# Patient Record
Sex: MALE | Race: WHITE | NOT HISPANIC OR LATINO | Employment: UNEMPLOYED | URBAN - METROPOLITAN AREA
[De-identification: names, ages, dates, MRNs, and addresses within clinical notes are randomized per-mention and may not be internally consistent; named-entity substitution may affect disease eponyms.]

---

## 2019-02-02 ENCOUNTER — ANESTHESIA EVENT (OUTPATIENT)
Dept: ENDOSCOPY | Facility: HOSPITAL | Age: 84
DRG: 377 | End: 2019-02-02
Payer: MEDICARE

## 2019-02-02 ENCOUNTER — HOSPITAL ENCOUNTER (INPATIENT)
Facility: HOSPITAL | Age: 84
LOS: 6 days | Discharge: NURSING, REHAB OR LONG-TERM CARE | DRG: 377 | End: 2019-02-08
Attending: EMERGENCY MEDICINE | Admitting: INTERNAL MEDICINE
Payer: MEDICARE

## 2019-02-02 ENCOUNTER — ANESTHESIA (OUTPATIENT)
Dept: ENDOSCOPY | Facility: HOSPITAL | Age: 84
DRG: 377 | End: 2019-02-02
Payer: MEDICARE

## 2019-02-02 DIAGNOSIS — K92.1 GASTROINTESTINAL HEMORRHAGE WITH MELENA: ICD-10-CM

## 2019-02-02 DIAGNOSIS — I49.1 ATRIAL ECTOPY: ICD-10-CM

## 2019-02-02 DIAGNOSIS — K92.2 GI BLEED: Primary | ICD-10-CM

## 2019-02-02 DIAGNOSIS — R09.02 HYPOXIA: ICD-10-CM

## 2019-02-02 DIAGNOSIS — D64.9 ANEMIA, UNSPECIFIED TYPE: ICD-10-CM

## 2019-02-02 DIAGNOSIS — R57.9 SHOCK CIRCULATORY: ICD-10-CM

## 2019-02-02 PROBLEM — J44.9 COPD (CHRONIC OBSTRUCTIVE PULMONARY DISEASE): Status: ACTIVE | Noted: 2019-02-02

## 2019-02-02 PROBLEM — I10 HYPERTENSION: Status: ACTIVE | Noted: 2019-02-02

## 2019-02-02 LAB
ABO + RH BLD: NORMAL
ALBUMIN SERPL BCP-MCNC: 1.5 G/DL
ALP SERPL-CCNC: 57 U/L
ALT SERPL W/O P-5'-P-CCNC: 10 U/L
ANION GAP SERPL CALC-SCNC: 9 MMOL/L
ANISOCYTOSIS BLD QL SMEAR: ABNORMAL
ANISOCYTOSIS BLD QL SMEAR: SLIGHT
AST SERPL-CCNC: 15 U/L
BASO STIPL BLD QL SMEAR: ABNORMAL
BASOPHILS # BLD AUTO: 0.01 K/UL
BASOPHILS # BLD AUTO: 0.02 K/UL
BASOPHILS # BLD AUTO: 0.03 K/UL
BASOPHILS # BLD AUTO: 0.05 K/UL
BASOPHILS NFR BLD: 0.1 %
BASOPHILS NFR BLD: 0.2 %
BILIRUB SERPL-MCNC: 0.7 MG/DL
BLD GP AB SCN CELLS X3 SERPL QL: NORMAL
BLD PROD TYP BPU: NORMAL
BLOOD UNIT EXPIRATION DATE: NORMAL
BLOOD UNIT TYPE CODE: 5100
BLOOD UNIT TYPE CODE: 5100
BLOOD UNIT TYPE CODE: 9500
BLOOD UNIT TYPE: NORMAL
BUN SERPL-MCNC: 45 MG/DL
BURR CELLS BLD QL SMEAR: ABNORMAL
BURR CELLS BLD QL SMEAR: ABNORMAL
CALCIUM SERPL-MCNC: 7.3 MG/DL
CHLORIDE SERPL-SCNC: 115 MMOL/L
CO2 SERPL-SCNC: 21 MMOL/L
CODING SYSTEM: NORMAL
CREAT SERPL-MCNC: 0.7 MG/DL
DIFFERENTIAL METHOD: ABNORMAL
DISPENSE STATUS: NORMAL
DOHLE BOD BLD QL SMEAR: PRESENT
EOSINOPHIL # BLD AUTO: 0 K/UL
EOSINOPHIL # BLD AUTO: 0.1 K/UL
EOSINOPHIL NFR BLD: 0 %
EOSINOPHIL NFR BLD: 0.1 %
EOSINOPHIL NFR BLD: 0.2 %
EOSINOPHIL NFR BLD: 0.5 %
ERYTHROCYTE [DISTWIDTH] IN BLOOD BY AUTOMATED COUNT: 16.9 %
ERYTHROCYTE [DISTWIDTH] IN BLOOD BY AUTOMATED COUNT: 17.1 %
ERYTHROCYTE [DISTWIDTH] IN BLOOD BY AUTOMATED COUNT: 17.2 %
ERYTHROCYTE [DISTWIDTH] IN BLOOD BY AUTOMATED COUNT: 19.8 %
EST. GFR  (AFRICAN AMERICAN): >60 ML/MIN/1.73 M^2
EST. GFR  (NON AFRICAN AMERICAN): >60 ML/MIN/1.73 M^2
GIANT PLATELETS BLD QL SMEAR: PRESENT
GIANT PLATELETS BLD QL SMEAR: PRESENT
GLUCOSE SERPL-MCNC: 128 MG/DL
HCT VFR BLD AUTO: 19.4 %
HCT VFR BLD AUTO: 31.6 %
HCT VFR BLD AUTO: 35.2 %
HCT VFR BLD AUTO: 35.7 %
HGB BLD-MCNC: 11.1 G/DL
HGB BLD-MCNC: 11.3 G/DL
HGB BLD-MCNC: 5.8 G/DL
HGB BLD-MCNC: 9.9 G/DL
HYPOCHROMIA BLD QL SMEAR: ABNORMAL
IMM GRANULOCYTES # BLD AUTO: 0.05 K/UL
IMM GRANULOCYTES # BLD AUTO: 0.11 K/UL
IMM GRANULOCYTES NFR BLD AUTO: 0.3 %
IMM GRANULOCYTES NFR BLD AUTO: 0.4 %
IMM GRANULOCYTES NFR BLD AUTO: 0.4 %
IMM GRANULOCYTES NFR BLD AUTO: 0.5 %
INR PPP: 1.7
LIPASE SERPL-CCNC: 20 U/L
LYMPHOCYTES # BLD AUTO: 0.4 K/UL
LYMPHOCYTES # BLD AUTO: 0.5 K/UL
LYMPHOCYTES # BLD AUTO: 0.6 K/UL
LYMPHOCYTES # BLD AUTO: 1.8 K/UL
LYMPHOCYTES NFR BLD: 15 %
LYMPHOCYTES NFR BLD: 2.1 %
LYMPHOCYTES NFR BLD: 4.2 %
LYMPHOCYTES NFR BLD: 4.3 %
MCH RBC QN AUTO: 28.4 PG
MCH RBC QN AUTO: 28.4 PG
MCH RBC QN AUTO: 28.7 PG
MCH RBC QN AUTO: 29.1 PG
MCHC RBC AUTO-ENTMCNC: 29.9 G/DL
MCHC RBC AUTO-ENTMCNC: 31.3 G/DL
MCHC RBC AUTO-ENTMCNC: 31.5 G/DL
MCHC RBC AUTO-ENTMCNC: 31.7 G/DL
MCV RBC AUTO: 90 FL
MCV RBC AUTO: 91 FL
MCV RBC AUTO: 92 FL
MCV RBC AUTO: 96 FL
MONOCYTES # BLD AUTO: 0.9 K/UL
MONOCYTES # BLD AUTO: 1 K/UL
MONOCYTES # BLD AUTO: 1.3 K/UL
MONOCYTES # BLD AUTO: 1.3 K/UL
MONOCYTES NFR BLD: 10.9 %
MONOCYTES NFR BLD: 6.1 %
MONOCYTES NFR BLD: 6.7 %
MONOCYTES NFR BLD: 7.7 %
NEUTROPHILS # BLD AUTO: 13.1 K/UL
NEUTROPHILS # BLD AUTO: 18.8 K/UL
NEUTROPHILS # BLD AUTO: 8.7 K/UL
NEUTROPHILS # BLD AUTO: 9.9 K/UL
NEUTROPHILS NFR BLD: 73.1 %
NEUTROPHILS NFR BLD: 87.2 %
NEUTROPHILS NFR BLD: 88.5 %
NEUTROPHILS NFR BLD: 91.1 %
NRBC BLD-RTO: 0 /100 WBC
OVALOCYTES BLD QL SMEAR: ABNORMAL
OVALOCYTES BLD QL SMEAR: ABNORMAL
PLATELET # BLD AUTO: 62 K/UL
PLATELET # BLD AUTO: 62 K/UL
PLATELET # BLD AUTO: 73 K/UL
PLATELET # BLD AUTO: 87 K/UL
PLATELET BLD QL SMEAR: ABNORMAL
PMV BLD AUTO: 10.5 FL
PMV BLD AUTO: 11 FL
PMV BLD AUTO: 11.6 FL
PMV BLD AUTO: 11.7 FL
POIKILOCYTOSIS BLD QL SMEAR: SLIGHT
POIKILOCYTOSIS BLD QL SMEAR: SLIGHT
POLYCHROMASIA BLD QL SMEAR: ABNORMAL
POLYCHROMASIA BLD QL SMEAR: ABNORMAL
POTASSIUM SERPL-SCNC: 4 MMOL/L
PROT SERPL-MCNC: 3.5 G/DL
PROTHROMBIN TIME: 17 SEC
RBC # BLD AUTO: 2.02 M/UL
RBC # BLD AUTO: 3.48 M/UL
RBC # BLD AUTO: 3.81 M/UL
RBC # BLD AUTO: 3.98 M/UL
SCHISTOCYTES BLD QL SMEAR: ABNORMAL
SODIUM SERPL-SCNC: 145 MMOL/L
TOXIC GRANULES BLD QL SMEAR: PRESENT
TRANS ERYTHROCYTES VOL PATIENT: NORMAL ML
WBC # BLD AUTO: 11.38 K/UL
WBC # BLD AUTO: 11.92 K/UL
WBC # BLD AUTO: 14.84 K/UL
WBC # BLD AUTO: 20.65 K/UL

## 2019-02-02 PROCEDURE — 43235 EGD DIAGNOSTIC BRUSH WASH: CPT | Performed by: INTERNAL MEDICINE

## 2019-02-02 PROCEDURE — P9021 RED BLOOD CELLS UNIT: HCPCS

## 2019-02-02 PROCEDURE — 86901 BLOOD TYPING SEROLOGIC RH(D): CPT

## 2019-02-02 PROCEDURE — 96365 THER/PROPH/DIAG IV INF INIT: CPT

## 2019-02-02 PROCEDURE — 63600175 PHARM REV CODE 636 W HCPCS: Performed by: NURSE PRACTITIONER

## 2019-02-02 PROCEDURE — 20000000 HC ICU ROOM

## 2019-02-02 PROCEDURE — 71000033 HC RECOVERY, INTIAL HOUR: Performed by: INTERNAL MEDICINE

## 2019-02-02 PROCEDURE — 25000003 PHARM REV CODE 250

## 2019-02-02 PROCEDURE — 36000707: Performed by: INTERNAL MEDICINE

## 2019-02-02 PROCEDURE — 99223 PR INITIAL HOSPITAL CARE,LEVL III: ICD-10-PCS | Mod: ,,, | Performed by: INTERNAL MEDICINE

## 2019-02-02 PROCEDURE — 43235 EGD DIAGNOSTIC BRUSH WASH: CPT | Mod: ,,, | Performed by: INTERNAL MEDICINE

## 2019-02-02 PROCEDURE — 25000003 PHARM REV CODE 250: Performed by: EMERGENCY MEDICINE

## 2019-02-02 PROCEDURE — 93010 EKG 12-LEAD: ICD-10-PCS | Mod: ,,, | Performed by: INTERNAL MEDICINE

## 2019-02-02 PROCEDURE — 83690 ASSAY OF LIPASE: CPT

## 2019-02-02 PROCEDURE — 99232 SBSQ HOSP IP/OBS MODERATE 35: CPT | Mod: 25,,, | Performed by: INTERNAL MEDICINE

## 2019-02-02 PROCEDURE — 37000009 HC ANESTHESIA EA ADD 15 MINS: Performed by: INTERNAL MEDICINE

## 2019-02-02 PROCEDURE — 99291 CRITICAL CARE FIRST HOUR: CPT | Mod: ,,, | Performed by: EMERGENCY MEDICINE

## 2019-02-02 PROCEDURE — D9220A PRA ANESTHESIA: ICD-10-PCS | Mod: CRNA,,, | Performed by: NURSE ANESTHETIST, CERTIFIED REGISTERED

## 2019-02-02 PROCEDURE — D9220A PRA ANESTHESIA: ICD-10-PCS | Mod: ANES,,, | Performed by: ANESTHESIOLOGY

## 2019-02-02 PROCEDURE — 36000706: Performed by: INTERNAL MEDICINE

## 2019-02-02 PROCEDURE — 99291 PR CRITICAL CARE, E/M 30-74 MINUTES: ICD-10-PCS | Mod: ,,, | Performed by: EMERGENCY MEDICINE

## 2019-02-02 PROCEDURE — C9113 INJ PANTOPRAZOLE SODIUM, VIA: HCPCS | Performed by: NURSE PRACTITIONER

## 2019-02-02 PROCEDURE — 93005 ELECTROCARDIOGRAM TRACING: CPT

## 2019-02-02 PROCEDURE — 51702 INSERT TEMP BLADDER CATH: CPT | Mod: 59

## 2019-02-02 PROCEDURE — D9220A PRA ANESTHESIA: Mod: ANES,,, | Performed by: ANESTHESIOLOGY

## 2019-02-02 PROCEDURE — 99232 PR SUBSEQUENT HOSPITAL CARE,LEVL II: ICD-10-PCS | Mod: 25,,, | Performed by: INTERNAL MEDICINE

## 2019-02-02 PROCEDURE — 25000003 PHARM REV CODE 250: Performed by: NURSE ANESTHETIST, CERTIFIED REGISTERED

## 2019-02-02 PROCEDURE — 37000008 HC ANESTHESIA 1ST 15 MINUTES: Performed by: INTERNAL MEDICINE

## 2019-02-02 PROCEDURE — 99223 1ST HOSP IP/OBS HIGH 75: CPT | Mod: ,,, | Performed by: INTERNAL MEDICINE

## 2019-02-02 PROCEDURE — 85610 PROTHROMBIN TIME: CPT

## 2019-02-02 PROCEDURE — 63600175 PHARM REV CODE 636 W HCPCS: Performed by: EMERGENCY MEDICINE

## 2019-02-02 PROCEDURE — 94761 N-INVAS EAR/PLS OXIMETRY MLT: CPT

## 2019-02-02 PROCEDURE — 36430 TRANSFUSION BLD/BLD COMPNT: CPT | Mod: 59

## 2019-02-02 PROCEDURE — 99285 EMERGENCY DEPT VISIT HI MDM: CPT | Mod: 25

## 2019-02-02 PROCEDURE — D9220A PRA ANESTHESIA: Mod: CRNA,,, | Performed by: NURSE ANESTHETIST, CERTIFIED REGISTERED

## 2019-02-02 PROCEDURE — 80053 COMPREHEN METABOLIC PANEL: CPT

## 2019-02-02 PROCEDURE — 86920 COMPATIBILITY TEST SPIN: CPT

## 2019-02-02 PROCEDURE — C9113 INJ PANTOPRAZOLE SODIUM, VIA: HCPCS | Performed by: EMERGENCY MEDICINE

## 2019-02-02 PROCEDURE — 85025 COMPLETE CBC W/AUTO DIFF WBC: CPT

## 2019-02-02 PROCEDURE — 96375 TX/PRO/DX INJ NEW DRUG ADDON: CPT

## 2019-02-02 PROCEDURE — 93010 ELECTROCARDIOGRAM REPORT: CPT | Mod: ,,, | Performed by: INTERNAL MEDICINE

## 2019-02-02 PROCEDURE — 71000039 HC RECOVERY, EACH ADD'L HOUR: Performed by: INTERNAL MEDICINE

## 2019-02-02 PROCEDURE — 63600175 PHARM REV CODE 636 W HCPCS: Performed by: NURSE ANESTHETIST, CERTIFIED REGISTERED

## 2019-02-02 PROCEDURE — 43235 PR EGD, FLEX, DIAGNOSTIC: ICD-10-PCS | Mod: ,,, | Performed by: INTERNAL MEDICINE

## 2019-02-02 PROCEDURE — 36430 TRANSFUSION BLD/BLD COMPNT: CPT

## 2019-02-02 RX ORDER — NOREPINEPHRINE BITARTRATE/D5W 4MG/250ML
0.02 PLASTIC BAG, INJECTION (ML) INTRAVENOUS CONTINUOUS
Status: DISCONTINUED | OUTPATIENT
Start: 2019-02-02 | End: 2019-02-04

## 2019-02-02 RX ORDER — PROPOFOL 10 MG/ML
VIAL (ML) INTRAVENOUS
Status: DISCONTINUED | OUTPATIENT
Start: 2019-02-02 | End: 2019-02-02

## 2019-02-02 RX ORDER — POTASSIUM CHLORIDE 20 MEQ/1
40 TABLET, EXTENDED RELEASE ORAL ONCE
COMMUNITY

## 2019-02-02 RX ORDER — EPHEDRINE SULFATE 50 MG/ML
INJECTION, SOLUTION INTRAVENOUS
Status: DISCONTINUED | OUTPATIENT
Start: 2019-02-02 | End: 2019-02-02

## 2019-02-02 RX ORDER — ERYTHROMYCIN 5 MG/G
1 OINTMENT OPHTHALMIC NIGHTLY
COMMUNITY

## 2019-02-02 RX ORDER — LORATADINE 10 MG/1
10 TABLET ORAL DAILY
COMMUNITY

## 2019-02-02 RX ORDER — SODIUM CHLORIDE 0.9 % (FLUSH) 0.9 %
3 SYRINGE (ML) INJECTION
Status: DISCONTINUED | OUTPATIENT
Start: 2019-02-02 | End: 2019-02-08 | Stop reason: HOSPADM

## 2019-02-02 RX ORDER — HYDROCODONE BITARTRATE AND ACETAMINOPHEN 500; 5 MG/1; MG/1
TABLET ORAL
Status: DISCONTINUED | OUTPATIENT
Start: 2019-02-02 | End: 2019-02-08 | Stop reason: HOSPADM

## 2019-02-02 RX ORDER — SIMVASTATIN 5 MG/1
20 TABLET, FILM COATED ORAL NIGHTLY
Status: DISCONTINUED | OUTPATIENT
Start: 2019-02-02 | End: 2019-02-06

## 2019-02-02 RX ORDER — MIDAZOLAM HYDROCHLORIDE 1 MG/ML
INJECTION, SOLUTION INTRAMUSCULAR; INTRAVENOUS
Status: DISCONTINUED | OUTPATIENT
Start: 2019-02-02 | End: 2019-02-02

## 2019-02-02 RX ORDER — GUAIFENESIN 100 MG/5ML
10 SOLUTION ORAL 4 TIMES DAILY PRN
COMMUNITY

## 2019-02-02 RX ORDER — DOCUSATE SODIUM 100 MG/1
100 CAPSULE, LIQUID FILLED ORAL 2 TIMES DAILY
COMMUNITY

## 2019-02-02 RX ORDER — FERROUS GLUCONATE 324(38)MG
324 TABLET ORAL
COMMUNITY

## 2019-02-02 RX ORDER — ASPIRIN 81 MG/1
81 TABLET ORAL DAILY
Status: ON HOLD | COMMUNITY
End: 2019-02-08 | Stop reason: HOSPADM

## 2019-02-02 RX ORDER — PHENYLEPHRINE HYDROCHLORIDE 10 MG/ML
INJECTION INTRAVENOUS
Status: DISCONTINUED | OUTPATIENT
Start: 2019-02-02 | End: 2019-02-02

## 2019-02-02 RX ORDER — FENTANYL CITRATE 50 UG/ML
25 INJECTION, SOLUTION INTRAMUSCULAR; INTRAVENOUS EVERY 5 MIN PRN
Status: DISCONTINUED | OUTPATIENT
Start: 2019-02-02 | End: 2019-02-02 | Stop reason: HOSPADM

## 2019-02-02 RX ORDER — NOREPINEPHRINE BITARTRATE 1 MG/ML
INJECTION, SOLUTION INTRAVENOUS
Status: COMPLETED
Start: 2019-02-02 | End: 2019-02-02

## 2019-02-02 RX ORDER — TAMSULOSIN HYDROCHLORIDE 0.4 MG/1
0.4 CAPSULE ORAL DAILY
COMMUNITY

## 2019-02-02 RX ORDER — LANOLIN ALCOHOL/MO/W.PET/CERES
1000 CREAM (GRAM) TOPICAL DAILY
COMMUNITY

## 2019-02-02 RX ORDER — FINASTERIDE 5 MG/1
5 TABLET, FILM COATED ORAL DAILY
COMMUNITY

## 2019-02-02 RX ORDER — OXYCODONE HYDROCHLORIDE 5 MG/1
5 TABLET ORAL
Status: DISCONTINUED | OUTPATIENT
Start: 2019-02-02 | End: 2019-02-02 | Stop reason: HOSPADM

## 2019-02-02 RX ORDER — SIMVASTATIN 20 MG/1
20 TABLET, FILM COATED ORAL NIGHTLY
COMMUNITY

## 2019-02-02 RX ORDER — KETAMINE HYDROCHLORIDE 10 MG/ML
INJECTION, SOLUTION INTRAMUSCULAR; INTRAVENOUS
Status: DISCONTINUED | OUTPATIENT
Start: 2019-02-02 | End: 2019-02-02

## 2019-02-02 RX ORDER — PANTOPRAZOLE SODIUM 40 MG/10ML
80 INJECTION, POWDER, LYOPHILIZED, FOR SOLUTION INTRAVENOUS
Status: COMPLETED | OUTPATIENT
Start: 2019-02-02 | End: 2019-02-02

## 2019-02-02 RX ORDER — FUROSEMIDE 10 MG/ML
20 INJECTION INTRAMUSCULAR; INTRAVENOUS
Status: COMPLETED | OUTPATIENT
Start: 2019-02-02 | End: 2019-02-02

## 2019-02-02 RX ORDER — FINASTERIDE 5 MG/1
5 TABLET, FILM COATED ORAL DAILY
Status: DISCONTINUED | OUTPATIENT
Start: 2019-02-02 | End: 2019-02-06

## 2019-02-02 RX ORDER — FLUTICASONE FUROATE AND VILANTEROL 100; 25 UG/1; UG/1
1 POWDER RESPIRATORY (INHALATION) DAILY
COMMUNITY

## 2019-02-02 RX ORDER — FUROSEMIDE 20 MG/1
10 TABLET ORAL DAILY
COMMUNITY

## 2019-02-02 RX ORDER — ACETAMINOPHEN 325 MG/1
650 TABLET ORAL EVERY 4 HOURS PRN
COMMUNITY

## 2019-02-02 RX ORDER — PANTOPRAZOLE SODIUM 40 MG/10ML
40 INJECTION, POWDER, LYOPHILIZED, FOR SOLUTION INTRAVENOUS 2 TIMES DAILY
Status: DISCONTINUED | OUTPATIENT
Start: 2019-02-02 | End: 2019-02-03

## 2019-02-02 RX ORDER — ALBUTEROL SULFATE 1.25 MG/3ML
1.25 SOLUTION RESPIRATORY (INHALATION) EVERY 6 HOURS PRN
COMMUNITY

## 2019-02-02 RX ADMIN — PANTOPRAZOLE SODIUM 40 MG: 40 INJECTION, POWDER, FOR SOLUTION INTRAVENOUS at 10:02

## 2019-02-02 RX ADMIN — EPHEDRINE SULFATE 20 MG: 50 INJECTION, SOLUTION INTRAMUSCULAR; INTRAVENOUS; SUBCUTANEOUS at 04:02

## 2019-02-02 RX ADMIN — PANTOPRAZOLE SODIUM 40 MG: 40 INJECTION, POWDER, FOR SOLUTION INTRAVENOUS at 08:02

## 2019-02-02 RX ADMIN — MIDAZOLAM HYDROCHLORIDE 1 MG: 1 INJECTION, SOLUTION INTRAMUSCULAR; INTRAVENOUS at 04:02

## 2019-02-02 RX ADMIN — PANTOPRAZOLE SODIUM 80 MG: 40 INJECTION, POWDER, FOR SOLUTION INTRAVENOUS at 02:02

## 2019-02-02 RX ADMIN — PROPOFOL 50 MG: 10 INJECTION, EMULSION INTRAVENOUS at 04:02

## 2019-02-02 RX ADMIN — FUROSEMIDE 20 MG: 10 INJECTION, SOLUTION INTRAVENOUS at 03:02

## 2019-02-02 RX ADMIN — SODIUM CHLORIDE 1000 ML: 0.9 INJECTION, SOLUTION INTRAVENOUS at 02:02

## 2019-02-02 RX ADMIN — Medication 0.04 MCG/KG/MIN: at 11:02

## 2019-02-02 RX ADMIN — PHENYLEPHRINE HYDROCHLORIDE 200 MCG: 10 INJECTION INTRAVENOUS at 04:02

## 2019-02-02 RX ADMIN — VASOPRESSIN 5 UNITS: 20 INJECTION INTRAVENOUS at 04:02

## 2019-02-02 RX ADMIN — EPHEDRINE SULFATE 10 MG: 50 INJECTION, SOLUTION INTRAMUSCULAR; INTRAVENOUS; SUBCUTANEOUS at 04:02

## 2019-02-02 RX ADMIN — PHENYLEPHRINE HYDROCHLORIDE 400 MCG: 10 INJECTION INTRAVENOUS at 04:02

## 2019-02-02 RX ADMIN — CALCIUM GLUCONATE 1 G: 98 INJECTION, SOLUTION INTRAVENOUS at 03:02

## 2019-02-02 RX ADMIN — Medication 0.02 MCG/KG/MIN: at 10:02

## 2019-02-02 RX ADMIN — FINASTERIDE 5 MG: 5 TABLET, FILM COATED ORAL at 02:02

## 2019-02-02 RX ADMIN — VASOPRESSIN 3 UNITS: 20 INJECTION INTRAVENOUS at 04:02

## 2019-02-02 RX ADMIN — SODIUM CHLORIDE, SODIUM GLUCONATE, SODIUM ACETATE, POTASSIUM CHLORIDE, MAGNESIUM CHLORIDE, SODIUM PHOSPHATE, DIBASIC, AND POTASSIUM PHOSPHATE: .53; .5; .37; .037; .03; .012; .00082 INJECTION, SOLUTION INTRAVENOUS at 04:02

## 2019-02-02 RX ADMIN — KETAMINE HYDROCHLORIDE 20 MG: 10 INJECTION, SOLUTION INTRAMUSCULAR; INTRAVENOUS at 04:02

## 2019-02-02 NOTE — HOSPITAL COURSE
Patient admitted to MICU for close monitoring. EGD performed the day of presentation showed Grade D reflux esophagitis, duodenitis, and a large duodenal ulcer which was not amenable to intervention.     Overnight, patient had two melanotic bowel movements and hemoglobin dropped from 11.4 to 9.3. Per GI recs, IR was consulted and CTA performed; however, no active bleeding seen on the scan. Therefore, no embolization possible. Patient started on PPI drip and kept NPO per GI recs.     This morning, patient had an episodes of bleeding overnight which was not significant per nursing and CBC remains stable, BP remained stable with no need for pressors.  Will wean off oxygen as tolerated , per nursing he was sitting in bed this morning or RA with no desaturations , on rounds he was on 2 L doing well,stanislav step down today

## 2019-02-02 NOTE — ED NOTES
Psychosocial:  Patient is calm and cooperative.  Patients insight and judgement are appropriate to situation.  Appears clean, well maintained, with clothing appropriate to environment.  No evidence of hallucinations, delusions, or psychosis.    Neuro:  Eyes open spontaneously.  Awake, alert.  Oriented x 4.  Speech clear and appropriate.  Tolerating saliva secretions well.  Able to follow commands, demonstrating ability to actively and appropriately communicate within context of current conversation.      Airway:  Bilateral chest rise and fall.  RR regular and non labored.  Air entry patent.    Circulatory:  Skin warm, dry, and pink.  Apical and radial pulses strong and regular.     Abdomen:  Abdomen soft and non distended.      Urinary:  Indwelling guzman catheter in place.

## 2019-02-02 NOTE — ED TRIAGE NOTES
No consent done for PRBC transfusion.  The transfusion was emergent given his severe anemia and hypotension.  Emergency release blood used.

## 2019-02-02 NOTE — SUBJECTIVE & OBJECTIVE
Past Medical History:   Diagnosis Date    Anemia     Arthritis     BPH (benign prostatic hyperplasia)     Cataract     COPD (chronic obstructive pulmonary disease)     Dysphagia     Emphysema lung     GERD (gastroesophageal reflux disease)     Heart failure     Hyperlipidemia     Hypertension     Hypokalemia     MI (myocardial infarction)     Osteoarthritis     Stroke        No past surgical history on file.    Review of patient's allergies indicates:  No Known Allergies  Family History     None        Tobacco Use    Smoking status: Not on file   Substance and Sexual Activity    Alcohol use: Not on file    Drug use: Not on file    Sexual activity: Not on file     Review of Systems   Constitutional: Positive for activity change and fatigue. Negative for appetite change, chills and fever.   HENT: Negative for trouble swallowing.    Eyes: Negative for visual disturbance.   Respiratory: Negative for cough and shortness of breath.    Cardiovascular: Negative for chest pain.   Gastrointestinal: Negative for abdominal distention, abdominal pain, anal bleeding, blood in stool, constipation, diarrhea, nausea and vomiting.   Genitourinary: Negative for flank pain.   Musculoskeletal: Negative for arthralgias and back pain.   Skin: Negative for color change.   Allergic/Immunologic: Negative for immunocompromised state.   Neurological: Positive for dizziness.   Psychiatric/Behavioral: Negative for confusion.     Objective:     Vital Signs (Most Recent):  Temp: 97.9 °F (36.6 °C) (02/02/19 1322)  Pulse: 68 (02/02/19 1322)  Resp: (!) 21 (02/02/19 1322)  BP: (!) 105/57 (02/02/19 1322)  SpO2: (!) 92 % (02/02/19 1322) Vital Signs (24h Range):  Temp:  [95.4 °F (35.2 °C)-98.6 °F (37 °C)] 97.9 °F (36.6 °C)  Pulse:  [62-77] 68  Resp:  [15-85] 21  SpO2:  [85 %-100 %] 92 %  BP: ()/(42-66) 105/57     Weight: 43.1 kg (95 lb) (02/02/19 0147)  Body mass index is 14.88 kg/m².      Intake/Output Summary (Last 24 hours) at  2/2/2019 1325  Last data filed at 2/2/2019 0445  Gross per 24 hour   Intake 1815.85 ml   Output 400 ml   Net 1415.85 ml       Lines/Drains/Airways     Drain                 Urethral Catheter 02/02/19 0315 Temperature probe less than 1 day          Peripheral Intravenous Line                 Peripheral IV - Single Lumen 02/02/19 0149 Left Forearm less than 1 day         Peripheral IV - Single Lumen 02/02/19 0150 Right Forearm less than 1 day                Physical Exam   Constitutional: He is oriented to person, place, and time. He appears cachectic. No distress.   HENT:   Head: Normocephalic.   Eyes: Conjunctivae are normal. No scleral icterus.   Neck: Normal range of motion. Neck supple.   Cardiovascular: Normal rate and regular rhythm.   Pulmonary/Chest: Effort normal and breath sounds normal.   Abdominal: Soft. Bowel sounds are normal. He exhibits no distension and no mass. There is no tenderness. There is no guarding.   Musculoskeletal: Normal range of motion.   Neurological: He is oriented to person, place, and time.   Skin: Skin is warm and dry. There is pallor.   Psychiatric: He has a normal mood and affect.       Significant Labs:  CBC:   Recent Labs   Lab 02/02/19  0157 02/02/19  0457 02/02/19  0831   WBC 11.92 20.65* 14.84*   HGB 5.8* 11.1* 11.3*   HCT 19.4* 35.2* 35.7*   PLT 87* 62* 62*     BMP:   Recent Labs   Lab 02/02/19 0157   *      K 4.0   *   CO2 21*   BUN 45*   CREATININE 0.7   CALCIUM 7.3*     CMP:   Recent Labs   Lab 02/02/19 0157   *   CALCIUM 7.3*   ALBUMIN 1.5*   PROT 3.5*      K 4.0   CO2 21*   *   BUN 45*   CREATININE 0.7   ALKPHOS 57   ALT 10   AST 15   BILITOT 0.7     Coagulation:   Recent Labs   Lab 02/02/19 0157   INR 1.7*     Lipase:   Recent Labs   Lab 02/02/19 0157   LIPASE 20       Significant Imaging:  Imaging results within the past 24 hours have been reviewed.

## 2019-02-02 NOTE — ED NOTES
Critical care aware that there is no blood consent documentation at bedside; they reported that they will obtain one if more additional blood is required.

## 2019-02-02 NOTE — HPI
"Mr. Krish Blackburn is a 87 y.o. with a PMH of HLD, COPD ( on home oxygen) who presented from NH for GI bleed. When asking the patient he kept saying " I do not know why I am here." In the ED patient found to have a hgb of 5.8, INR of 1.7, and BUN of 45. He was hypotensive with SBPs in the 70 . He denied any SOB, lightheadedness, palpitation or chest pain. When examined he had melena on the padding. He was oriented to self and place.     In the ED he was given 3 units of blood and MICU was consulted for admission. GI was consulted in the ED     Can not determine if/when patient had a colonoscopy or endoscopy   "

## 2019-02-02 NOTE — TRANSFER OF CARE
"Anesthesia Transfer of Care Note    Patient: Krish Blackburn    Procedure(s) Performed: Procedure(s) (LRB):  EGD (ESOPHAGOGASTRODUODENOSCOPY) (N/A)    Patient location: PACU    Anesthesia Type: general    Transport from OR: Transported from OR on 6-10 L/min O2 by face mask with adequate spontaneous ventilation    Post pain: adequate analgesia    Post assessment: no apparent anesthetic complications and tolerated procedure well    Post vital signs: stable    Level of consciousness: lethargic    Nausea/Vomiting: no nausea/vomiting    Complications: none    Transfer of care protocol was followed      Last vitals:   Visit Vitals  /65   Pulse (P) 95   Temp (P) 36.6 °C (97.9 °F) (Temporal)   Resp (P) 17   Ht 5' 7" (1.702 m)   Wt 43.1 kg (95 lb)   SpO2 (P) 100%   BMI 14.88 kg/m²     "

## 2019-02-02 NOTE — ED NOTES
Per Dr Arambula. No consent done for PRBC transfusion.  The transfusion was emergent given his severe anemia and hypotension.  Emergency release blood used.

## 2019-02-02 NOTE — ED NOTES
Jakub with critical care made aware of patient's bloody bowel movement and hypotension.  Timme reported that patient's Map should remain at 65 and above when asked what blood pressure parameters should be met.

## 2019-02-02 NOTE — CONSULTS
Patient evaluated by and admitted to Critical Care Medicine. Full H&P to follow.    Dania Leung NP  Critical Care Medicine

## 2019-02-02 NOTE — ASSESSMENT & PLAN NOTE
Patient is an 86 y/o male with unclear history of PUD ~1.5 years ago, presented with melena, hypotension and symptomatic anemia. All signs are suggestive of UGIB. No PPI on outpatient medication list. No history of liver disease. We will plan for EGD today to evaluate for source of bleeding.    Protonix 40mg IV BID  Intravascular resuscitation/support with IVFs and pRBCs as needed.  Serial H/H's transfuse as needed.  Discontinue all NSAIDs and Heparin products  Please correct any coagulopathy with platelets and FFP to a goal of platelets >50K and INR <2.5  Maintain IV access with 2 large bore IVs  Keep NPO.  Plan for EGD today

## 2019-02-02 NOTE — CONSULTS
[facility]  Gastroenterology  Consult Note    Patient Name: Krish Blackburn  MRN: [id number]  Admission Date: 2/2/2019  Hospital Length of Stay: 0 days  Code Status: Full Code   Attending Provider: Sascha Duran MD   Consulting Provider: Tal Lisa MD  Primary Care Physician: No primary care provider on file.  Principal Problem:GI bleed    Inpatient consult to Gastroenterology  Consult performed by: Tal Lisa MD  Consult ordered by: Tejas Arambula MD        Subjective:     HPI:  Krish Blackburn is an 88 y/o male with past medical history of HTN, HLD, COPD and reported PUD by family, presented with melena and anemia from nursing home, GI consulted for evaluation.    The patient has moderate dementia with limited history. He is not sure why he is in the hospital. He denies having abdominal pain, nausea or vomiting. He is not sure if he had melena, but per NH and ED report, patient had multiple episodes of melena overnight. Patient endorses mild dizziness, and states he had always been short of breath due to COPD. Daughter reports that the patient had an ulcer in his stomach that they were told was small and was not treated with endoscopy.    The patient was noted to be hypotensive in the ED with SBP in the 70's, but improved with resuscitation. His Hb was 5.4, but unknown baseline. Hb improved to 11 after 2 units of pRBCs. Patient is admitted to the ICU.    Past Medical History:   Diagnosis Date    Anemia     Arthritis     BPH (benign prostatic hyperplasia)     Cataract     COPD (chronic obstructive pulmonary disease)     Dysphagia     Emphysema lung     GERD (gastroesophageal reflux disease)     Heart failure     Hyperlipidemia     Hypertension     Hypokalemia     MI (myocardial infarction)     Osteoarthritis     Stroke        No past surgical history on file.    Review of patient's allergies indicates:  No Known Allergies  Family History     None        Tobacco Use    Smoking  status: Not on file   Substance and Sexual Activity    Alcohol use: Not on file    Drug use: Not on file    Sexual activity: Not on file     Review of Systems   Constitutional: Positive for activity change and fatigue. Negative for appetite change, chills and fever.   HENT: Negative for trouble swallowing.    Eyes: Negative for visual disturbance.   Respiratory: Negative for cough and shortness of breath.    Cardiovascular: Negative for chest pain.   Gastrointestinal: Negative for abdominal distention, abdominal pain, anal bleeding, blood in stool, constipation, diarrhea, nausea and vomiting.   Genitourinary: Negative for flank pain.   Musculoskeletal: Negative for arthralgias and back pain.   Skin: Negative for color change.   Allergic/Immunologic: Negative for immunocompromised state.   Neurological: Positive for dizziness.   Psychiatric/Behavioral: Negative for confusion.     Objective:     Vital Signs (Most Recent):  Temp: 97.9 °F (36.6 °C) (02/02/19 1322)  Pulse: 68 (02/02/19 1322)  Resp: (!) 21 (02/02/19 1322)  BP: (!) 105/57 (02/02/19 1322)  SpO2: (!) 92 % (02/02/19 1322) Vital Signs (24h Range):  Temp:  [95.4 °F (35.2 °C)-98.6 °F (37 °C)] 97.9 °F (36.6 °C)  Pulse:  [62-77] 68  Resp:  [15-85] 21  SpO2:  [85 %-100 %] 92 %  BP: ()/(42-66) 105/57     Weight: 43.1 kg (95 lb) (02/02/19 0147)  Body mass index is 14.88 kg/m².      Intake/Output Summary (Last 24 hours) at 2/2/2019 1325  Last data filed at 2/2/2019 0445  Gross per 24 hour   Intake 1815.85 ml   Output 400 ml   Net 1415.85 ml       Lines/Drains/Airways     Drain                 Urethral Catheter 02/02/19 0315 Temperature probe less than 1 day          Peripheral Intravenous Line                 Peripheral IV - Single Lumen 02/02/19 0149 Left Forearm less than 1 day         Peripheral IV - Single Lumen 02/02/19 0150 Right Forearm less than 1 day                Physical Exam   Constitutional: He is oriented to person, place, and time. He appears  cachectic. No distress.   HENT:   Head: Normocephalic.   Eyes: Conjunctivae are normal. No scleral icterus.   Neck: Normal range of motion. Neck supple.   Cardiovascular: Normal rate and regular rhythm.   Pulmonary/Chest: Effort normal and breath sounds normal.   Abdominal: Soft. Bowel sounds are normal. He exhibits no distension and no mass. There is no tenderness. There is no guarding.   Musculoskeletal: Normal range of motion.   Neurological: He is oriented to person, place, and time.   Skin: Skin is warm and dry. There is pallor.   Psychiatric: He has a normal mood and affect.       Significant Labs:  CBC:   Recent Labs   Lab 02/02/19 0157 02/02/19  0457 02/02/19  0831   WBC 11.92 20.65* 14.84*   HGB 5.8* 11.1* 11.3*   HCT 19.4* 35.2* 35.7*   PLT 87* 62* 62*     BMP:   Recent Labs   Lab 02/02/19 0157   *      K 4.0   *   CO2 21*   BUN 45*   CREATININE 0.7   CALCIUM 7.3*     CMP:   Recent Labs   Lab 02/02/19 0157   *   CALCIUM 7.3*   ALBUMIN 1.5*   PROT 3.5*      K 4.0   CO2 21*   *   BUN 45*   CREATININE 0.7   ALKPHOS 57   ALT 10   AST 15   BILITOT 0.7     Coagulation:   Recent Labs   Lab 02/02/19 0157   INR 1.7*     Lipase:   Recent Labs   Lab 02/02/19 0157   LIPASE 20       Significant Imaging:  Imaging results within the past 24 hours have been reviewed.    Assessment/Plan:     * GI bleed    Patient is an 86 y/o male with unclear history of PUD ~1.5 years ago, presented with melena, hypotension and symptomatic anemia. All signs are suggestive of UGIB. No PPI on outpatient medication list. No history of liver disease. We will plan for EGD today to evaluate for source of bleeding.    Protonix 40mg IV BID  Intravascular resuscitation/support with IVFs and pRBCs as needed.  Serial H/H's transfuse as needed.  Discontinue all NSAIDs and Heparin products  Please correct any coagulopathy with platelets and FFP to a goal of platelets >50K and INR <2.5  Maintain IV access with  2 large bore IVs  Keep NPO.  Plan for EGD today           Thank you for your consult. I will follow-up with patient. Please contact us if you have any additional questions.    Bernardo Lisa MD  Gastroenterology  [facility]

## 2019-02-02 NOTE — HPI
Krish Blackburn is an 86 y/o male with past medical history of HTN, HLD, COPD and reported PUD by family, presented with melena and anemia from nursing home, GI consulted for evaluation.    The patient has moderate dementia with limited history. He is not sure why he is in the hospital. He denies having abdominal pain, nausea or vomiting. He is not sure if he had melena, but per NH and ED report, patient had multiple episodes of melena overnight. Patient endorses mild dizziness, and states he had always been short of breath due to COPD. Daughter reports that the patient had an ulcer in his stomach that they were told was small and was not treated with endoscopy.    The patient was noted to be hypotensive in the ED with SBP in the 70's, but improved with resuscitation. His Hb was 5.4, but unknown baseline. Hb improved to 11 after 2 units of pRBCs. Patient is admitted to the ICU.

## 2019-02-02 NOTE — H&P
"[facility]  Critical Care Medicine  History & Physical    Patient Name: Krish Blackburn  MRN: [id number]  Admission Date: 2/2/2019  Hospital Length of Stay: 0 days  Code Status: Full Code  Attending Physician: Sascha Duran MD   Primary Care Provider: No primary care provider on file.   Principal Problem: GI bleed    Subjective:     HPI:  Mr. Krish Blackburn is a 87 y.o. with a PMH of HLD, HTN, COPD ( on home oxygen) who presented from NH for GI bleed. When asking the patient he kept saying " I do not know why I am here." In the ED patient found to have a hgb of 5.8, INR of 1.7, and BUN of 45. He was hypotensive with SBPs in the 70 . He denied any SOB, lightheadedness, palpitation or chest pain. When examined he had melena on the padding. He was oriented to self and place.     In the ED he was given 3 units of blood and MICU was consulted for admission. GI was consulted in the ED     Can not determine if/when patient had a colonoscopy or endoscopy     Hospital/ICU Course:  Patient admitted to MICU for close monitoring      Past Medical History:   Diagnosis Date    Anemia     Arthritis     BPH (benign prostatic hyperplasia)     Cataract     COPD (chronic obstructive pulmonary disease)     Dysphagia     Emphysema lung     GERD (gastroesophageal reflux disease)     Heart failure     Hyperlipidemia     Hypertension     Hypokalemia     MI (myocardial infarction)     Osteoarthritis     Stroke        No past surgical history on file.    Review of patient's allergies indicates:  No Known Allergies    Family History     None        Tobacco Use    Smoking status: Not on file   Substance and Sexual Activity    Alcohol use: Not on file    Drug use: Not on file    Sexual activity: Not on file      Review of Systems   Constitutional: Negative for activity change, appetite change, fatigue and fever.   HENT: Negative for congestion and sore throat.    Gastrointestinal: Positive for anal bleeding. " Negative for abdominal distention, abdominal pain, diarrhea and nausea.   Genitourinary: Negative for dysuria.   Skin: Positive for pallor.   Neurological: Negative for dizziness, weakness and light-headedness.   Psychiatric/Behavioral: Negative for agitation.     Objective:     Vital Signs (Most Recent):  Temp: 97.5 °F (36.4 °C) (02/02/19 0523)  Pulse: 71 (02/02/19 0523)  Resp: (!) 24 (02/02/19 0516)  BP: (!) 91/54 (02/02/19 0523)  SpO2: 97 % (02/02/19 0523) Vital Signs (24h Range):  Temp:  [95.4 °F (35.2 °C)-97.5 °F (36.4 °C)] 97.5 °F (36.4 °C)  Pulse:  [63-77] 71  Resp:  [15-85] 24  SpO2:  [85 %-100 %] 97 %  BP: ()/(42-62) 91/54   Weight: 43.1 kg (95 lb)  Body mass index is 14.88 kg/m².      Intake/Output Summary (Last 24 hours) at 2/2/2019 0535  Last data filed at 2/2/2019 0445  Gross per 24 hour   Intake 1815.85 ml   Output 400 ml   Net 1415.85 ml       Physical Exam   Constitutional: He appears well-developed. No distress.   HENT:   Head: Normocephalic and atraumatic.   Eyes: EOM are normal. Pupils are equal, round, and reactive to light.   Neck: Normal range of motion. Neck supple.   Cardiovascular: Normal rate and regular rhythm.   Pulmonary/Chest: Effort normal and breath sounds normal.   Abdominal: Soft. Bowel sounds are normal. He exhibits no distension.   JIMY with tarry black blood.    Musculoskeletal: Normal range of motion. He exhibits edema (bilateral 1+ pitting edema ).   Neurological: He is alert.   Skin: Skin is warm and dry. He is not diaphoretic. There is pallor.   Psychiatric: He has a normal mood and affect. His behavior is normal. Judgment and thought content normal.       Vents:     Lines/Drains/Airways     Peripheral Intravenous Line                 Peripheral IV - Single Lumen 02/02/19 0149 Left Forearm less than 1 day         Peripheral IV - Single Lumen 02/02/19 0150 Right Forearm less than 1 day              Significant Labs:    CBC/Anemia Profile:  Recent Labs   Lab 02/02/19  0153  02/02/19  0457   WBC 11.92 20.65*   HGB 5.8* 11.1*   HCT 19.4* 35.2*   PLT 87*  --    MCV 96 92   RDW 19.8* 16.9*        Chemistries:  Recent Labs   Lab 02/02/19  0157      K 4.0   *   CO2 21*   BUN 45*   CREATININE 0.7   CALCIUM 7.3*   ALBUMIN 1.5*   PROT 3.5*   BILITOT 0.7   ALKPHOS 57   ALT 10   AST 15           Significant Imaging: I have reviewed all pertinent imaging results/findings within the past 24 hours.  I have reviewed and interpreted all pertinent imaging results/findings within the past 24 hours.    Assessment/Plan:     Pulmonary   COPD (chronic obstructive pulmonary disease)    - Patient with history of COPD   - He states he wears oxygen but unable to quantify how many litres  - Duo- nebs PRN      GI   * GI bleed    - Patient with dark blood in his pad and JIMY. Do not know if patient is on NSAIDs , but he does take ASA no other anticoagulants  - GI consulted awaiting rec's  - Will continue resuscitative measures and keep hgb > 7   - Maintain two bore IV's  - CBC q4 for now  - NPO pending GI evaluation   - MAP >65  - Type and screened and 3 units already transfusing in ED   - PPI 80 mg IV once then 40 mg BID   - Hold all anticoagulation, no NSAIDs          Critical Care Daily Checklist:    A: Awake: RASS Goal/Actual Goal:    Actual:     B: Spontaneous Breathing Trial Performed?     C: SAT & SBT Coordinated?  NA                      D: Delirium: CAM-ICU     E: Early Mobility Performed? Yes   F: Feeding Goal:    Status:     Current Diet Order   Procedures    Diet NPO      AS: Analgesia/Sedation NA   T: Thromboembolic Prophylaxis NA   H: HOB > 300 Yes   U: Stress Ulcer Prophylaxis (if needed) PPI   G: Glucose Control -   B: Bowel Function Stool Occurrence: 1   I: Indwelling Catheter (Lines & Hurley) Necessity Hurley   D: De-escalation of Antimicrobials/Pharmacotherapies -    Plan for the day/ETD GI evaluation     Code Status:  Family/Goals of Care: Full Code         Critical secondary to  Patient has a condition that poses threat to life and bodily function: Acute GI bleed      Critical care was time spent personally by me on the following activities: development of treatment plan with patient or surrogate and bedside caregivers, discussions with consultants, evaluation of patient's response to treatment, examination of patient, ordering and performing treatments and interventions, ordering and review of laboratory studies, ordering and review of radiographic studies, pulse oximetry, re-evaluation of patient's condition. This critical care time did not overlap with that of any other provider or involve time for any procedures.     Kailey Call MD  Critical Care Medicine  [facility]

## 2019-02-02 NOTE — H&P (VIEW-ONLY)
[facility]  Gastroenterology  Consult Note    Patient Name: Krish Blackburn  MRN: [id number]  Admission Date: 2/2/2019  Hospital Length of Stay: 0 days  Code Status: Full Code   Attending Provider: Sascha Duran MD   Consulting Provider: Tal Lisa MD  Primary Care Physician: No primary care provider on file.  Principal Problem:GI bleed    Inpatient consult to Gastroenterology  Consult performed by: Tal Lisa MD  Consult ordered by: Tejas Arambula MD        Subjective:     HPI:  Krish Blackburn is an 86 y/o male with past medical history of HTN, HLD, COPD and reported PUD by family, presented with melena and anemia from nursing home, GI consulted for evaluation.    The patient has moderate dementia with limited history. He is not sure why he is in the hospital. He denies having abdominal pain, nausea or vomiting. He is not sure if he had melena, but per NH and ED report, patient had multiple episodes of melena overnight. Patient endorses mild dizziness, and states he had always been short of breath due to COPD. Daughter reports that the patient had an ulcer in his stomach that they were told was small and was not treated with endoscopy.    The patient was noted to be hypotensive in the ED with SBP in the 70's, but improved with resuscitation. His Hb was 5.4, but unknown baseline. Hb improved to 11 after 2 units of pRBCs. Patient is admitted to the ICU.    Past Medical History:   Diagnosis Date    Anemia     Arthritis     BPH (benign prostatic hyperplasia)     Cataract     COPD (chronic obstructive pulmonary disease)     Dysphagia     Emphysema lung     GERD (gastroesophageal reflux disease)     Heart failure     Hyperlipidemia     Hypertension     Hypokalemia     MI (myocardial infarction)     Osteoarthritis     Stroke        No past surgical history on file.    Review of patient's allergies indicates:  No Known Allergies  Family History     None        Tobacco Use    Smoking  status: Not on file   Substance and Sexual Activity    Alcohol use: Not on file    Drug use: Not on file    Sexual activity: Not on file     Review of Systems   Constitutional: Positive for activity change and fatigue. Negative for appetite change, chills and fever.   HENT: Negative for trouble swallowing.    Eyes: Negative for visual disturbance.   Respiratory: Negative for cough and shortness of breath.    Cardiovascular: Negative for chest pain.   Gastrointestinal: Negative for abdominal distention, abdominal pain, anal bleeding, blood in stool, constipation, diarrhea, nausea and vomiting.   Genitourinary: Negative for flank pain.   Musculoskeletal: Negative for arthralgias and back pain.   Skin: Negative for color change.   Allergic/Immunologic: Negative for immunocompromised state.   Neurological: Positive for dizziness.   Psychiatric/Behavioral: Negative for confusion.     Objective:     Vital Signs (Most Recent):  Temp: 97.9 °F (36.6 °C) (02/02/19 1322)  Pulse: 68 (02/02/19 1322)  Resp: (!) 21 (02/02/19 1322)  BP: (!) 105/57 (02/02/19 1322)  SpO2: (!) 92 % (02/02/19 1322) Vital Signs (24h Range):  Temp:  [95.4 °F (35.2 °C)-98.6 °F (37 °C)] 97.9 °F (36.6 °C)  Pulse:  [62-77] 68  Resp:  [15-85] 21  SpO2:  [85 %-100 %] 92 %  BP: ()/(42-66) 105/57     Weight: 43.1 kg (95 lb) (02/02/19 0147)  Body mass index is 14.88 kg/m².      Intake/Output Summary (Last 24 hours) at 2/2/2019 1325  Last data filed at 2/2/2019 0445  Gross per 24 hour   Intake 1815.85 ml   Output 400 ml   Net 1415.85 ml       Lines/Drains/Airways     Drain                 Urethral Catheter 02/02/19 0315 Temperature probe less than 1 day          Peripheral Intravenous Line                 Peripheral IV - Single Lumen 02/02/19 0149 Left Forearm less than 1 day         Peripheral IV - Single Lumen 02/02/19 0150 Right Forearm less than 1 day                Physical Exam   Constitutional: He is oriented to person, place, and time. He appears  cachectic. No distress.   HENT:   Head: Normocephalic.   Eyes: Conjunctivae are normal. No scleral icterus.   Neck: Normal range of motion. Neck supple.   Cardiovascular: Normal rate and regular rhythm.   Pulmonary/Chest: Effort normal and breath sounds normal.   Abdominal: Soft. Bowel sounds are normal. He exhibits no distension and no mass. There is no tenderness. There is no guarding.   Musculoskeletal: Normal range of motion.   Neurological: He is oriented to person, place, and time.   Skin: Skin is warm and dry. There is pallor.   Psychiatric: He has a normal mood and affect.       Significant Labs:  CBC:   Recent Labs   Lab 02/02/19 0157 02/02/19  0457 02/02/19  0831   WBC 11.92 20.65* 14.84*   HGB 5.8* 11.1* 11.3*   HCT 19.4* 35.2* 35.7*   PLT 87* 62* 62*     BMP:   Recent Labs   Lab 02/02/19 0157   *      K 4.0   *   CO2 21*   BUN 45*   CREATININE 0.7   CALCIUM 7.3*     CMP:   Recent Labs   Lab 02/02/19 0157   *   CALCIUM 7.3*   ALBUMIN 1.5*   PROT 3.5*      K 4.0   CO2 21*   *   BUN 45*   CREATININE 0.7   ALKPHOS 57   ALT 10   AST 15   BILITOT 0.7     Coagulation:   Recent Labs   Lab 02/02/19 0157   INR 1.7*     Lipase:   Recent Labs   Lab 02/02/19 0157   LIPASE 20       Significant Imaging:  Imaging results within the past 24 hours have been reviewed.    Assessment/Plan:     * GI bleed    Patient is an 86 y/o male with unclear history of PUD ~1.5 years ago, presented with melena, hypotension and symptomatic anemia. All signs are suggestive of UGIB. No PPI on outpatient medication list. No history of liver disease. We will plan for EGD today to evaluate for source of bleeding.    Protonix 40mg IV BID  Intravascular resuscitation/support with IVFs and pRBCs as needed.  Serial H/H's transfuse as needed.  Discontinue all NSAIDs and Heparin products  Please correct any coagulopathy with platelets and FFP to a goal of platelets >50K and INR <2.5  Maintain IV access with  2 large bore IVs  Keep NPO.  Plan for EGD today           Thank you for your consult. I will follow-up with patient. Please contact us if you have any additional questions.    Bernardo Lisa MD  Gastroenterology  [facility]

## 2019-02-02 NOTE — ED PROVIDER NOTES
Encounter Date: 2/2/2019       History     Chief Complaint   Patient presents with    Rectal Bleeding     from [facility], nursing home reports rectal bleeding, EMS states pt had blood clots in diaper      87-year-old male presents care of EMS from a nursing facility.  He reports GI bleeding that started today.  EMS reports that his systolic was in the 60s with a arrived.  Patient has a history of anemia.  Nursing home records were reviewed.  No evidence of systemic anticoagulation.  Patient is on a baby aspirin.  Unable to get ROS 2/2 critical illness.  The patients available PMH, PSH, medications, allergies, and triage vital signs were reviewed just prior to their medical evaluation.          Review of patient's allergies indicates:  No Known Allergies  No past medical history on file.  No past surgical history on file.  No family history on file.  Social History     Tobacco Use    Smoking status: Not on file   Substance Use Topics    Alcohol use: Not on file    Drug use: Not on file     Review of Systems   Unable to perform ROS: Acuity of condition       Physical Exam     Initial Vitals [02/02/19 0147]   BP Pulse Resp Temp SpO2   (!) 70/43 72 16 -- (!) 85 %      MAP       --         Physical Exam    Constitutional: He is not diaphoretic. No distress.   Cachectic    HENT:   Head: Normocephalic and atraumatic.   Nose: Nose normal.   Eyes: Right eye exhibits no discharge. Left eye exhibits no discharge.   Pale conjunctiva   Neck: Normal range of motion. Neck supple.   nontender   Cardiovascular: Normal rate, regular rhythm and normal heart sounds. Exam reveals no gallop and no friction rub.    No murmur heard.  Pulmonary/Chest: Breath sounds normal. No respiratory distress. He has no wheezes. He has no rhonchi. He has no rales.   Abdominal: Soft. He exhibits no distension. There is no tenderness. There is no rebound and no guarding.   Genitourinary: Penis normal. Rectal exam shows guaiac positive  stool. Guaiac positive stool. : Acceptable.No discharge found.   Musculoskeletal: He exhibits no edema or tenderness.   Neurological: He is alert and oriented to person, place, and time. GCS score is 15. GCS eye subscore is 4. GCS verbal subscore is 5. GCS motor subscore is 6.   Skin: Skin is warm and dry. No rash noted. No erythema. There is pallor.   Psychiatric: He has a normal mood and affect. His behavior is normal. Thought content normal.         ED Course   Procedures  Labs Reviewed   CBC W/ AUTO DIFFERENTIAL   COMPREHENSIVE METABOLIC PANEL   PROTIME-INR   LIPASE   TYPE & SCREEN     EKG Readings: (Independently Interpreted)   Initial Reading: No STEMI. Rhythm: Normal Sinus Rhythm. Ectopy: PACs. T Waves Flipped: V5 and V6.   Poor qrs progression in V3-4       Imaging Results    None          Medical Decision Making:   History:   I obtained history from: EMS provider.  Old Medical Records: I decided to obtain old medical records.  Old Records Summarized: other records.  Clinical Tests:   Lab Tests: Ordered and Reviewed  Medical Tests: Ordered and Reviewed  ED Management:  87-year-old male presents with GI bleed.  Vitals with hypotension.  Physical exam as above.  Labs with severe anemia.  Improved with 1 L normal saline.  Patient required emergency release blood.  Total of 3 units of blood were eventually transfused.  He has vastly improved.  Given Protonix IV.  Upper GI source is likely.  Will admit to the ICU.  Discussed with GI who will evaluate this morning.  Did bedside teaching.  All questions answered.  Patient acknowledges understanding.  Other:   I have discussed this case with another health care provider.  GI, MICU              Attending Attestation:         Attending Critical Care:   Critical Care Times:   Direct Patient Care (initial evaluation, reassessments, and time considering the case)................................................................20 minutes.   Additional  History from reviewing old medical records or taking additional history from the family, EMS, PCP, etc.......................5 minutes.   Ordering, Reviewing, and Interpreting Diagnostic Studies...............................................................................................................5 minutes.   Documentation..................................................................................................................................................................................5 minutes.   Consultation with other Physicians. .................................................................................................................................................5 minutes.   ==============================================================  · Total Critical Care Time - exclusive of procedural time: 40 minutes.  ==============================================================  Critical care was necessary to treat or prevent imminent or life-threatening deterioration of the following conditions: hypotension (GI bleed).   Critical care was time spent personally by me on the following activities: obtaining history from patient or relative, examination of patient, ordering lab, x-rays, and/or EKG, development of treatment plan with patient or relative, ordering and performing treatments and interventions, evaluation of patient's response to treatment, discussion with consultants, interpretation of cardiac measurements, re-evaluation of patient's conition, ventilator management and end of life discussions.   Critical Care Condition: potentially life-threatening                  Clinical Impression:   GI bleed  Shock  Anemia  Hypoxia      Disposition:   Disposition: Admitted  Condition: Serious                        Tejas Arambula MD  02/02/19 0520       Tejas Arambula MD  02/02/19 0521       Tejas Arambula MD  02/02/19 0709

## 2019-02-02 NOTE — INTERVAL H&P NOTE
Pre-Procedure H and P Addendum    Patient seen and examined.  History and exam unchanged from prior history and physical.      Procedure: EGD  Indication: melena  ASA Class: per anesthesiology  Airway: normal  Neck Mobility: full range of motion  Mallampatti score: per anesthesia  History of anesthesia problems: no  Family history of anesthesia problems: no  Anesthesia Plan: MAC    Anesthesia/Surgery risks, benefits and alternative options discussed and understood by patient/family.          Active Hospital Problems    Diagnosis  POA    *GI bleed [K92.2]  Yes    COPD (chronic obstructive pulmonary disease) [J44.9]  Unknown      Resolved Hospital Problems   No resolved problems to display.

## 2019-02-02 NOTE — ED NOTES
Critical care called in regard to patient's blood pressure parameters that should be met. Provider reports that they will call me back.

## 2019-02-02 NOTE — ASSESSMENT & PLAN NOTE
- Patient with dark blood in his pad and JIMY. Do not know if patient is on NSAIDs , but he does take ASA no other anticoagulants  - GI: EGD showing gastritis/duodenitis and duodenal ulcer   - 40mg IV PPI BID  - repeat hgb decreased from 11.4 to 9.3. Patient passed a quarter sized clot and has been persistently hypotensive requiring levophed support.   - IR consulted, CTA GI protocol ordered.   - MAP< 65, given 500cc without improvement   - Will continue resuscitative measures and keep hgb > 7   - Maintain two bore IV's  - CBC q4 for now  - MAP >65  - Type and screened s/p 3U RBC in ED  - hold all anticoagulation and aspirin   - CTA performed, no active bleeding seen  - Continue PPI gtt, NPO

## 2019-02-02 NOTE — SUBJECTIVE & OBJECTIVE
Past Medical History:   Diagnosis Date    Anemia     Arthritis     BPH (benign prostatic hyperplasia)     Cataract     COPD (chronic obstructive pulmonary disease)     Dysphagia     Emphysema lung     GERD (gastroesophageal reflux disease)     Heart failure     Hyperlipidemia     Hypertension     Hypokalemia     MI (myocardial infarction)     Osteoarthritis     Stroke        No past surgical history on file.    Review of patient's allergies indicates:  No Known Allergies    Family History     None        Tobacco Use    Smoking status: Not on file   Substance and Sexual Activity    Alcohol use: Not on file    Drug use: Not on file    Sexual activity: Not on file      Review of Systems   Constitutional: Negative for activity change, appetite change, fatigue and fever.   HENT: Negative for congestion and sore throat.    Gastrointestinal: Positive for anal bleeding. Negative for abdominal distention, abdominal pain, diarrhea and nausea.   Genitourinary: Negative for dysuria.   Skin: Positive for pallor.   Neurological: Negative for dizziness, weakness and light-headedness.   Psychiatric/Behavioral: Negative for agitation.     Objective:     Vital Signs (Most Recent):  Temp: 97.5 °F (36.4 °C) (02/02/19 0523)  Pulse: 71 (02/02/19 0523)  Resp: (!) 24 (02/02/19 0516)  BP: (!) 91/54 (02/02/19 0523)  SpO2: 97 % (02/02/19 0523) Vital Signs (24h Range):  Temp:  [95.4 °F (35.2 °C)-97.5 °F (36.4 °C)] 97.5 °F (36.4 °C)  Pulse:  [63-77] 71  Resp:  [15-85] 24  SpO2:  [85 %-100 %] 97 %  BP: ()/(42-62) 91/54   Weight: 43.1 kg (95 lb)  Body mass index is 14.88 kg/m².      Intake/Output Summary (Last 24 hours) at 2/2/2019 0544  Last data filed at 2/2/2019 0445  Gross per 24 hour   Intake 1815.85 ml   Output 400 ml   Net 1415.85 ml       Physical Exam   Constitutional: He appears well-developed. No distress.   HENT:   Head: Normocephalic and atraumatic.   Eyes: EOM are normal. Pupils are equal, round, and  reactive to light.   Neck: Normal range of motion. Neck supple.   Cardiovascular: Normal rate and regular rhythm.   Pulmonary/Chest: Effort normal and breath sounds normal.   Abdominal: Soft. Bowel sounds are normal. He exhibits no distension.   JIMY with tarry black blood.    Musculoskeletal: Normal range of motion. He exhibits edema (bilateral 1+ pitting edema ).   Neurological: He is alert.   Skin: Skin is warm and dry. He is not diaphoretic. There is pallor.   Psychiatric: He has a normal mood and affect. His behavior is normal. Judgment and thought content normal.       Vents:     Lines/Drains/Airways     Peripheral Intravenous Line                 Peripheral IV - Single Lumen 02/02/19 0149 Left Forearm less than 1 day         Peripheral IV - Single Lumen 02/02/19 0150 Right Forearm less than 1 day              Significant Labs:    CBC/Anemia Profile:  Recent Labs   Lab 02/02/19 0157 02/02/19  0457   WBC 11.92 20.65*   HGB 5.8* 11.1*   HCT 19.4* 35.2*   PLT 87*  --    MCV 96 92   RDW 19.8* 16.9*        Chemistries:  Recent Labs   Lab 02/02/19 0157      K 4.0   *   CO2 21*   BUN 45*   CREATININE 0.7   CALCIUM 7.3*   ALBUMIN 1.5*   PROT 3.5*   BILITOT 0.7   ALKPHOS 57   ALT 10   AST 15           Significant Imaging: I have reviewed all pertinent imaging results/findings within the past 24 hours.  I have reviewed and interpreted all pertinent imaging results/findings within the past 24 hours.

## 2019-02-02 NOTE — ASSESSMENT & PLAN NOTE
- Patient with history of COPD   - He states he wears oxygen but unable to quantify how many litres  - Duo- nebs PRN

## 2019-02-02 NOTE — ANESTHESIA PREPROCEDURE EVALUATION
02/02/2019  Krish Blackburn is a 87 y.o., male.    Anesthesia Evaluation    I have reviewed the Patient Summary Reports.    I have reviewed the Nursing Notes.   I have reviewed the Medications.     Review of Systems  Cardiovascular:   Hypertension Past MI     Pulmonary:   COPD    Hepatic/GI:   GERD    Musculoskeletal:   Arthritis     Neurological:   CVA  Dementia        Physical Exam  General:  Malnutrition    Airway/Jaw/Neck:  Airway Findings: Mouth Opening: Normal Tongue: Normal  General Airway Assessment: Average  Mallampati: II  Improves to II, I with phonation.  TM Distance: Normal, at least 6 cm  Jaw/Neck Findings:  Neck ROM: Extension Decreased, Mild      Dental:  Dental Findings: Edentulous   Chest/Lungs:  Chest/Lungs Findings: Normal Respiratory Rate, Rhonchi     Heart/Vascular:  Heart Findings: Normal       Mental Status:  Mental Status Findings:  Cooperative       Patient Active Problem List   Diagnosis    GI bleed    COPD (chronic obstructive pulmonary disease)    Hypertension       Anesthesia Plan  Type of Anesthesia, risks & benefits discussed:  Anesthesia Type:  general, MAC  Patient's Preference:   Intra-op Monitoring Plan: standard ASA monitors  Intra-op Monitoring Plan Comments:   Post Op Pain Control Plan:   Post Op Pain Control Plan Comments:   Induction:   IV  Beta Blocker:  Patient is not currently on a Beta-Blocker (No further documentation required).       Informed Consent: Patient representative understands risks and agrees with Anesthesia plan.  Questions answered. Anesthesia consent signed with patient representative.  ASA Score: 4     Day of Surgery Review of History & Physical:    H&P update referred to the surgeon.     Anesthesia Plan Notes: Consent obtained from patient's daughter (Tao Beltran [phone]) over the phone with 2 RN witnesses        Ready For Surgery From  Anesthesia Perspective.

## 2019-02-02 NOTE — [employer] PATIENT INSTRUCTIONS
Discharge Summary/Instructions after an Endoscopic Procedure  Patient Name: Krish Blackburn  Patient MRN: [id number]  Patient YOB: 1931 Saturday, February 02, 2019  Leonel Lim MD  RESTRICTIONS:  During your procedure today, you received medications for sedation.  These   medications may affect your judgment, balance and coordination.  Therefore,   for 24 hours, you have the following restrictions:   - DO NOT drive a car, operate machinery, make legal/financial decisions,   sign important papers or drink alcohol.    ACTIVITY:  Today: no heavy lifting, straining or running due to procedural   sedation/anesthesia.  The following day: return to full activity including work.  DIET:  Eat and drink normally unless instructed otherwise.     TREATMENT FOR COMMON SIDE EFFECTS:  - Mild abdominal pain, nausea, belching, bloating or excessive gas:  rest,   eat lightly and use a heating pad.  - Sore Throat: treat with throat lozenges and/or gargle with warm salt   water.  - Because air was used during the procedure, expelling large amounts of air   from your rectum or belching is normal.  - If a bowel prep was taken, you may not have a bowel movement for 1-3 days.    This is normal.  SYMPTOMS TO WATCH FOR AND REPORT TO YOUR PHYSICIAN:  1. Abdominal pain or bloating, other than gas cramps.  2. Chest pain.  3. Back pain.  4. Signs of infection such as: chills or fever occurring within 24 hours   after the procedure.  5. Rectal bleeding, which would show as bright red, maroon, or black stools.   (A tablespoon of blood from the rectum is not serious, especially if   hemorrhoids are present.)  6. Vomiting.  7. Weakness or dizziness.  GO DIRECTLY TO THE NEAREST EMERGENCY ROOM IF YOU HAVE ANY OF THE FOLLOWING:      Difficulty breathing              Chills and/or fever over 101 F   Persistent vomiting and/or vomiting blood   Severe abdominal pain   Severe chest pain   Black, tarry stools   Bleeding- more than one  tablespoon   Any other symptom or condition that you feel may need urgent attention  Your doctor recommends these additional instructions:  If any biopsies were taken, your doctors clinic will contact you in 1 to 2   weeks with any results.  - Return patient to ICU for ongoing care.   - NPO.   - Continue present medications.   - Use a proton pump inhibitor IV continuous infusion 72 hours.  For questions, problems or results please call your physician - Leonel Lim MD at Work:  [phone].  [facility], EMERGENCY ROOM PHONE NUMBER: [phone]  IF A COMPLICATION OR EMERGENCY SITUATION ARISES AND YOU ARE UNABLE TO REACH   YOUR PHYSICIAN - GO DIRECTLY TO THE EMERGENCY ROOM.  Leonel Lim MD  2/2/2019 4:57:37 PM  This report has been verified and signed electronically.  [employer]

## 2019-02-02 NOTE — ASSESSMENT & PLAN NOTE
- Patient with dark blood in his pad and JIMY. Do not know if patient is on NSAIDs , but he does take ASA no other anticoagulants  - GI consulted awaiting rec's  - Will continue resuscitative measures and keep hgb > 7   - Maintain two bore IV's  - CBC q4 for now  - NPO pending GI evaluation   - MAP >65  - Type and screened and 3 units already transfusing in ED   - PPI 80 mg IV once then 40 mg BID   - Hold all anticoagulation, no NSAIDs

## 2019-02-03 PROBLEM — N40.0 BPH (BENIGN PROSTATIC HYPERPLASIA): Status: ACTIVE | Noted: 2019-02-03

## 2019-02-03 PROBLEM — E78.5 HYPERLIPIDEMIA: Status: ACTIVE | Noted: 2019-02-03

## 2019-02-03 LAB
ALBUMIN SERPL BCP-MCNC: 2.1 G/DL
ALLENS TEST: ABNORMAL
ALP SERPL-CCNC: 66 U/L
ALT SERPL W/O P-5'-P-CCNC: 11 U/L
ANION GAP SERPL CALC-SCNC: 8 MMOL/L
AST SERPL-CCNC: 16 U/L
BASOPHILS # BLD AUTO: 0.01 K/UL
BASOPHILS # BLD AUTO: 0.02 K/UL
BASOPHILS # BLD AUTO: 0.02 K/UL
BASOPHILS # BLD AUTO: 0.03 K/UL
BASOPHILS NFR BLD: 0.1 %
BASOPHILS NFR BLD: 0.2 %
BASOPHILS NFR BLD: 0.2 %
BASOPHILS NFR BLD: 0.3 %
BILIRUB SERPL-MCNC: 1.1 MG/DL
BUN SERPL-MCNC: 30 MG/DL
CALCIUM SERPL-MCNC: 8.1 MG/DL
CHLORIDE SERPL-SCNC: 112 MMOL/L
CO2 SERPL-SCNC: 26 MMOL/L
CREAT SERPL-MCNC: 0.7 MG/DL
DELSYS: ABNORMAL
DIFFERENTIAL METHOD: ABNORMAL
EOSINOPHIL # BLD AUTO: 0 K/UL
EOSINOPHIL NFR BLD: 0.1 %
EOSINOPHIL NFR BLD: 0.3 %
EOSINOPHIL NFR BLD: 0.4 %
EOSINOPHIL NFR BLD: 0.5 %
ERYTHROCYTE [DISTWIDTH] IN BLOOD BY AUTOMATED COUNT: 17.6 %
ERYTHROCYTE [DISTWIDTH] IN BLOOD BY AUTOMATED COUNT: 17.9 %
ERYTHROCYTE [DISTWIDTH] IN BLOOD BY AUTOMATED COUNT: 18.1 %
ERYTHROCYTE [DISTWIDTH] IN BLOOD BY AUTOMATED COUNT: 18.2 %
EST. GFR  (AFRICAN AMERICAN): >60 ML/MIN/1.73 M^2
EST. GFR  (NON AFRICAN AMERICAN): >60 ML/MIN/1.73 M^2
GLUCOSE SERPL-MCNC: 107 MG/DL
HCO3 UR-SCNC: 23.3 MMOL/L (ref 24–28)
HCT VFR BLD AUTO: 29.3 %
HCT VFR BLD AUTO: 30.3 %
HCT VFR BLD AUTO: 32.2 %
HCT VFR BLD AUTO: 37.2 %
HGB BLD-MCNC: 11.4 G/DL
HGB BLD-MCNC: 9.1 G/DL
HGB BLD-MCNC: 9.3 G/DL
HGB BLD-MCNC: 9.8 G/DL
IMM GRANULOCYTES # BLD AUTO: 0.02 K/UL
IMM GRANULOCYTES # BLD AUTO: 0.03 K/UL
IMM GRANULOCYTES # BLD AUTO: 0.03 K/UL
IMM GRANULOCYTES # BLD AUTO: 0.04 K/UL
IMM GRANULOCYTES NFR BLD AUTO: 0.2 %
IMM GRANULOCYTES NFR BLD AUTO: 0.3 %
IMM GRANULOCYTES NFR BLD AUTO: 0.4 %
IMM GRANULOCYTES NFR BLD AUTO: 0.5 %
LYMPHOCYTES # BLD AUTO: 0.4 K/UL
LYMPHOCYTES # BLD AUTO: 0.5 K/UL
LYMPHOCYTES # BLD AUTO: 0.6 K/UL
LYMPHOCYTES # BLD AUTO: 0.8 K/UL
LYMPHOCYTES NFR BLD: 4.8 %
LYMPHOCYTES NFR BLD: 5.1 %
LYMPHOCYTES NFR BLD: 6.8 %
LYMPHOCYTES NFR BLD: 7.1 %
MAGNESIUM SERPL-MCNC: 1.7 MG/DL
MCH RBC QN AUTO: 28.3 PG
MCH RBC QN AUTO: 28.4 PG
MCH RBC QN AUTO: 28.4 PG
MCH RBC QN AUTO: 28.8 PG
MCHC RBC AUTO-ENTMCNC: 30.4 G/DL
MCHC RBC AUTO-ENTMCNC: 30.6 G/DL
MCHC RBC AUTO-ENTMCNC: 30.7 G/DL
MCHC RBC AUTO-ENTMCNC: 31.1 G/DL
MCV RBC AUTO: 92 FL
MCV RBC AUTO: 92 FL
MCV RBC AUTO: 93 FL
MCV RBC AUTO: 94 FL
MONOCYTES # BLD AUTO: 0.8 K/UL
MONOCYTES # BLD AUTO: 0.9 K/UL
MONOCYTES NFR BLD: 10.6 %
MONOCYTES NFR BLD: 8.8 %
MONOCYTES NFR BLD: 9.6 %
MONOCYTES NFR BLD: 9.9 %
NEUTROPHILS # BLD AUTO: 6.8 K/UL
NEUTROPHILS # BLD AUTO: 7.2 K/UL
NEUTROPHILS # BLD AUTO: 7.4 K/UL
NEUTROPHILS # BLD AUTO: 8.9 K/UL
NEUTROPHILS NFR BLD: 81.6 %
NEUTROPHILS NFR BLD: 83.4 %
NEUTROPHILS NFR BLD: 84 %
NEUTROPHILS NFR BLD: 84.8 %
NRBC BLD-RTO: 0 /100 WBC
PCO2 BLDA: 37.6 MMHG (ref 35–45)
PH SMN: 7.4 [PH] (ref 7.35–7.45)
PHOSPHATE SERPL-MCNC: 2.7 MG/DL
PLATELET # BLD AUTO: 103 K/UL
PLATELET # BLD AUTO: 79 K/UL
PLATELET # BLD AUTO: 87 K/UL
PLATELET # BLD AUTO: 92 K/UL
PMV BLD AUTO: 10.8 FL
PMV BLD AUTO: 11.4 FL
PMV BLD AUTO: 11.5 FL
PMV BLD AUTO: 11.5 FL
PO2 BLDA: 37 MMHG (ref 80–100)
POC BE: -1 MMOL/L
POC SATURATED O2: 71 % (ref 95–100)
POC TCO2: 24 MMOL/L (ref 23–27)
POTASSIUM SERPL-SCNC: 3.4 MMOL/L
PROT SERPL-MCNC: 4.5 G/DL
RBC # BLD AUTO: 3.2 M/UL
RBC # BLD AUTO: 3.29 M/UL
RBC # BLD AUTO: 3.45 M/UL
RBC # BLD AUTO: 3.96 M/UL
SAMPLE: ABNORMAL
SITE: ABNORMAL
SODIUM SERPL-SCNC: 146 MMOL/L
WBC # BLD AUTO: 10.65 K/UL
WBC # BLD AUTO: 8.29 K/UL
WBC # BLD AUTO: 8.47 K/UL
WBC # BLD AUTO: 8.79 K/UL

## 2019-02-03 PROCEDURE — 99233 SBSQ HOSP IP/OBS HIGH 50: CPT | Mod: ,,, | Performed by: INTERNAL MEDICINE

## 2019-02-03 PROCEDURE — 87040 BLOOD CULTURE FOR BACTERIA: CPT

## 2019-02-03 PROCEDURE — 25000242 PHARM REV CODE 250 ALT 637 W/ HCPCS

## 2019-02-03 PROCEDURE — 80053 COMPREHEN METABOLIC PANEL: CPT

## 2019-02-03 PROCEDURE — 99233 PR SUBSEQUENT HOSPITAL CARE,LEVL III: ICD-10-PCS | Mod: ,,, | Performed by: INTERNAL MEDICINE

## 2019-02-03 PROCEDURE — 20000000 HC ICU ROOM

## 2019-02-03 PROCEDURE — 25000003 PHARM REV CODE 250

## 2019-02-03 PROCEDURE — 25000003 PHARM REV CODE 250: Performed by: STUDENT IN AN ORGANIZED HEALTH CARE EDUCATION/TRAINING PROGRAM

## 2019-02-03 PROCEDURE — 25000003 PHARM REV CODE 250: Performed by: INTERNAL MEDICINE

## 2019-02-03 PROCEDURE — 27000221 HC OXYGEN, UP TO 24 HOURS

## 2019-02-03 PROCEDURE — 51701 INSERT BLADDER CATHETER: CPT

## 2019-02-03 PROCEDURE — 83735 ASSAY OF MAGNESIUM: CPT

## 2019-02-03 PROCEDURE — 94640 AIRWAY INHALATION TREATMENT: CPT

## 2019-02-03 PROCEDURE — 84100 ASSAY OF PHOSPHORUS: CPT

## 2019-02-03 PROCEDURE — 85025 COMPLETE CBC W/AUTO DIFF WBC: CPT | Mod: 91

## 2019-02-03 PROCEDURE — C9113 INJ PANTOPRAZOLE SODIUM, VIA: HCPCS

## 2019-02-03 PROCEDURE — 94760 N-INVAS EAR/PLS OXIMETRY 1: CPT

## 2019-02-03 PROCEDURE — 97165 OT EVAL LOW COMPLEX 30 MIN: CPT

## 2019-02-03 PROCEDURE — 63600175 PHARM REV CODE 636 W HCPCS

## 2019-02-03 PROCEDURE — 25500020 PHARM REV CODE 255: Performed by: INTERNAL MEDICINE

## 2019-02-03 PROCEDURE — 94761 N-INVAS EAR/PLS OXIMETRY MLT: CPT

## 2019-02-03 RX ORDER — IPRATROPIUM BROMIDE AND ALBUTEROL SULFATE 2.5; .5 MG/3ML; MG/3ML
3 SOLUTION RESPIRATORY (INHALATION) EVERY 6 HOURS PRN
Status: DISCONTINUED | OUTPATIENT
Start: 2019-02-03 | End: 2019-02-08 | Stop reason: HOSPADM

## 2019-02-03 RX ORDER — POTASSIUM CHLORIDE 20 MEQ/1
40 TABLET, EXTENDED RELEASE ORAL ONCE
Status: DISCONTINUED | OUTPATIENT
Start: 2019-02-03 | End: 2019-02-04

## 2019-02-03 RX ADMIN — IPRATROPIUM BROMIDE AND ALBUTEROL SULFATE 3 ML: .5; 3 SOLUTION RESPIRATORY (INHALATION) at 03:02

## 2019-02-03 RX ADMIN — DEXTROSE 8 MG/HR: 50 INJECTION, SOLUTION INTRAVENOUS at 11:02

## 2019-02-03 RX ADMIN — DEXTROSE 8 MG/HR: 50 INJECTION, SOLUTION INTRAVENOUS at 08:02

## 2019-02-03 RX ADMIN — IOHEXOL 75 ML: 350 INJECTION, SOLUTION INTRAVENOUS at 07:02

## 2019-02-03 RX ADMIN — SODIUM CHLORIDE, SODIUM LACTATE, POTASSIUM CHLORIDE, AND CALCIUM CHLORIDE 500 ML: .6; .31; .03; .02 INJECTION, SOLUTION INTRAVENOUS at 02:02

## 2019-02-03 RX ADMIN — DEXTROSE 8 MG/HR: 50 INJECTION, SOLUTION INTRAVENOUS at 04:02

## 2019-02-03 RX ADMIN — SODIUM CHLORIDE, SODIUM LACTATE, POTASSIUM CHLORIDE, AND CALCIUM CHLORIDE 500 ML: .6; .31; .03; .02 INJECTION, SOLUTION INTRAVENOUS at 12:02

## 2019-02-03 NOTE — SUBJECTIVE & OBJECTIVE
Past Medical History:   Diagnosis Date    Anemia     Arthritis     BPH (benign prostatic hyperplasia)     Cataract     COPD (chronic obstructive pulmonary disease)     Dysphagia     Emphysema lung     GERD (gastroesophageal reflux disease)     Heart failure     Hyperlipidemia     Hypertension     Hypokalemia     MI (myocardial infarction)     Osteoarthritis     Stroke      Overnight: Please see hospital course above.     History reviewed. No pertinent surgical history.    Review of patient's allergies indicates:  No Known Allergies    Family History     None        Tobacco Use    Smoking status: Not on file   Substance and Sexual Activity    Alcohol use: Not on file    Drug use: Not on file    Sexual activity: Not on file      Review of Systems   Constitutional: Negative for activity change, appetite change, fatigue and fever.   HENT: Negative for congestion and sore throat.    Gastrointestinal: Positive for anal bleeding. Negative for abdominal distention, abdominal pain, diarrhea and nausea.   Genitourinary: Negative for dysuria.   Skin: Positive for pallor.   Neurological: Negative for dizziness, weakness and light-headedness.   Psychiatric/Behavioral: Negative for agitation.     Objective:     Vital Signs (Most Recent):  Temp: 96.3 °F (35.7 °C) (02/03/19 0200)  Pulse: 85 (02/03/19 0200)  Resp: 15 (02/03/19 0200)  BP: 99/62 (02/03/19 0200)  SpO2: (!) 92 % (02/03/19 0200) Vital Signs (24h Range):  Temp:  [95.4 °F (35.2 °C)-98.6 °F (37 °C)] 96.3 °F (35.7 °C)  Pulse:  [62-95] 85  Resp:  [15-85] 15  SpO2:  [89 %-100 %] 92 %  BP: ()/(42-66) 99/62   Weight: 49.9 kg (110 lb 0.2 oz)  Body mass index is 17.23 kg/m².      Intake/Output Summary (Last 24 hours) at 2/3/2019 0206  Last data filed at 2/3/2019 0200  Gross per 24 hour   Intake 2584.11 ml   Output 2075 ml   Net 509.11 ml       Physical Exam   Constitutional: He appears well-developed. No distress.   HENT:   Head: Normocephalic and  atraumatic.   Eyes: EOM are normal. Pupils are equal, round, and reactive to light.   Neck: Normal range of motion. Neck supple.   Cardiovascular: Normal rate and regular rhythm.   Pulmonary/Chest: Effort normal and breath sounds normal.   Abdominal: Soft. Bowel sounds are normal. He exhibits no distension.   JIMY with tarry black blood.    Musculoskeletal: Normal range of motion. He exhibits edema (bilateral 1+ pitting edema ).   Neurological: He is alert.   Skin: Skin is warm and dry. He is not diaphoretic. There is pallor.   Psychiatric: He has a normal mood and affect. His behavior is normal. Judgment and thought content normal.       Vents:  Oxygen Concentration (%): 50 (02/02/19 1708)  Lines/Drains/Airways     Drain                 Urethral Catheter 02/02/19 0315 Temperature probe less than 1 day          Peripheral Intravenous Line                 Peripheral IV - Single Lumen 02/02/19 0149 Left Forearm 1 day         Peripheral IV - Single Lumen 02/02/19 0150 Right Forearm 1 day              Significant Labs:    CBC/Anemia Profile:  Recent Labs   Lab 02/02/19  0831 02/02/19  1842 02/03/19  0101   WBC 14.84* 11.38 10.65   HGB 11.3* 9.9* 11.4*   HCT 35.7* 31.6* 37.2*   PLT 62* 73* 103*   MCV 90 91 94   RDW 17.1* 17.2* 17.9*        Chemistries:  Recent Labs   Lab 02/02/19  0157      K 4.0   *   CO2 21*   BUN 45*   CREATININE 0.7   CALCIUM 7.3*   ALBUMIN 1.5*   PROT 3.5*   BILITOT 0.7   ALKPHOS 57   ALT 10   AST 15           Significant Imaging: I have reviewed all pertinent imaging results/findings within the past 24 hours.  I have reviewed and interpreted all pertinent imaging results/findings within the past 24 hours.

## 2019-02-03 NOTE — PT/OT/SLP EVAL
"Occupational Therapy   Evaluation    Name: Krish Blackburn  MRN: [id number]  Admitting Diagnosis:  GI bleed 1 Day Post-Op    Recommendations:     Discharge Recommendations: (return to NH with therapy potentially)  Discharge Equipment Recommendations:  none  Barriers to discharge:  None    Assessment:     Krish Blackburn is a 87 y.o. male with a medical diagnosis of GI bleed.  He presents with limited session due to decreased blood pressure.  Pt is at risk for falls & requires (A) currently with ADL's. Performance deficits affecting function: weakness, impaired endurance, impaired self care skills, impaired functional mobilty, impaired balance, decreased upper extremity function, decreased lower extremity function, decreased safety awareness.      Rehab Prognosis: Fair; patient would benefit from acute skilled OT services to address these deficits and reach maximum level of function.       Plan:     Patient to be seen 3 x/week to address the above listed problems via self-care/home management, therapeutic activities, therapeutic exercises  · Plan of Care Expires: 03/05/19  · Plan of Care Reviewed with: patient, daughter, family    Subjective     Chief Complaint: wanting water to drink  Patient/Family Comments/goals: to have water to drink    Occupational Profile:  Living Environment & PLOF: Pt resides at nursing home & was independent with dressing & bathing (except needing (A) with washing back) & required (A) with transfers from toilet.  Pt was independent with propelling w/c however required (A) with transfers.  Pt does not drive.  Pt is retired from working in [employer] & enjoys "just about anything."  Equipment Used at Home:  (w/c, RW)  Assistance upon Discharge: NH    Pain/Comfort:  · Pain Rating 1: 0/10  · Pain Rating Post-Intervention 1: 0/10(no pain at rest, pain when RN working of IV's)    Patients cultural, spiritual, Anabaptist conflicts given the current situation: (none stated)    Objective: "     Communicated with: RN prior to session.  Patient found supine with blood pressure cuff, SCD, telemetry, peripheral IV, oxygen, pulse ox (continuous), guzman catheter(warming blanket) upon OT entry to room.  Family present through part of session & RN present throughout session.    General Precautions: Standard, NPO, fall     Occupational Performance:    Bed Mobility:    · Patient completed Rolling/Turning to Left with  moderate assistance  · Patient completed Rolling/Turning to Right with minimum assistance  · Patient completed Scooting/Bridging with total assistance with drawsheet transfer up HOB while supine     Activities of Daily Living:  · Grooming: maximal assistance while supine  · Upper Body Dressing: total assistance donning gown while supine    Cognitive/Visual Perceptual:  Cognitive/Psychosocial Skills:     -       Oriented to: Person, Time and Situation   -       Follows Commands/attention:Follows one-step commands  -       Communication: dysarthria  -       Memory: Impaired STM  -       Safety awareness/insight to disability: impaired   -       Mood/Affect/Coping skills/emotional control: Appropriate to situation    Physical Exam:  Skin integrity: Bruising of BUE and Thin  Sensation:    -       Intact  Dominant hand:    -       right  Upper Extremity Range of Motion:     -       Right Upper Extremity: WFL except 90* shoulder flexion  -       Left Upper Extremity: WFL except 90* shoulder flexion  Upper Extremity Strength:    -       Right Upper Extremity: 3-/5 shoulder flexion, 3+/5 all others  -       Left Upper Extremity: 3-/5 shoulder flexion, 3+/5 all others   Strength:    -       Right Upper Extremity: WFL  -       Left Upper Extremity: WFL    AMPAC 6 Click ADL:  AMPAC Total Score: 7    Treatment & Education:  Provided education regarding role of OT, POC, & discharge recommendations with pt & family verbalizing understanding.  Pt had no further questions & when asked whether there were any  "concerns pt reported none.    Education:  Patient left supine with all lines intact, call button in reach, RN notified, RN present and white board updated    GOALS:   Multidisciplinary Problems     Occupational Therapy Goals        Problem: Occupational Therapy Goal    Goal Priority Disciplines Outcome Interventions   Occupational Therapy Goal     OT, PT/OT Ongoing (interventions implemented as appropriate)    Description:  Goals to be met by:      Patient will increase functional independence with ADLs by performing:    UE Dressing with Moderate Assistance.  LE Dressing with Maximum Assistance.  Grooming while seated with Stand-by Assistance.  Toileting from bedside commode with Maximum Assistance for hygiene and clothing management.   Sitting at edge of bed x15 minutes with Stand-by Assistance.  Rolling to Bilateral with Supervision.   Supine to sit with Minimal Assistance.  Stand pivot transfers with Minimal Assistance.                      History:     Past Medical History:   Diagnosis Date    Anemia     Arthritis     BPH (benign prostatic hyperplasia)     Cataract     COPD (chronic obstructive pulmonary disease)     Dysphagia     Emphysema lung     GERD (gastroesophageal reflux disease)     Heart failure     Hyperlipidemia     Hypertension     Hypokalemia     MI (myocardial infarction)     Osteoarthritis     Stroke        History reviewed. No pertinent surgical history.    Clinical Decision Makin.  OT Low:  "Pt evaluation falls under low complexity for evaluation coding due to performance deficits noted in 1-3 areas as stated above and 0 co-morbities affecting current functional status. Data obtained from problem focused assessments. No modifications or assistance was required for completion of evaluation. Only brief occupational profile and history review completed."     Time Tracking:     OT Date of Treatment: 19  OT Start Time: 1348  OT Stop Time: 1415  OT Total Time (min): " 27 min    Billable Minutes:Evaluation 27    FARNAZ Hammer  2/3/2019

## 2019-02-03 NOTE — PROGRESS NOTES
"[facility]  Critical Care Medicine  Progress Note    Patient Name: Krish Blackburn  MRN: [id number]  Admission Date: 2/2/2019  Hospital Length of Stay: 1 days  Code Status: Full Code  Attending Provider: Sascha Duran MD  Primary Care Provider: No primary care provider on file.   Principal Problem: GI bleed    Subjective:     HPI:  Mr. Krish Blackburn is a 87 y.o. with a PMH of HLD, COPD ( on home oxygen) who presented from NH for GI bleed. When asking the patient he kept saying " I do not know why I am here." In the ED patient found to have a hgb of 5.8, INR of 1.7, and BUN of 45. He was hypotensive with SBPs in the 70 . He denied any SOB, lightheadedness, palpitation or chest pain. When examined he had melena on the padding. He was oriented to self and place.     In the ED he was given 3 units of blood and MICU was consulted for admission. GI was consulted in the ED     Can not determine if/when patient had a colonoscopy or endoscopy     Hospital/ICU Course:  Patient admitted to MICU for close monitoring. EGD performed the day of presentation showed Grade D reflux esophagitis, duodenitis, and a large duodenal ulcer which was not amenable to intervention.     Overnight, patient had two melanotic bowel movements and hemoglobin dropped from 11.4 to 9.3. Per GI recs, IR was consulted and CTA performed; however, no active bleeding seen on the scan. Therefore, no embolization possible. Patient started on PPI drip per GI recs.     Past Medical History:   Diagnosis Date    Anemia     Arthritis     BPH (benign prostatic hyperplasia)     Cataract     COPD (chronic obstructive pulmonary disease)     Dysphagia     Emphysema lung     GERD (gastroesophageal reflux disease)     Heart failure     Hyperlipidemia     Hypertension     Hypokalemia     MI (myocardial infarction)     Osteoarthritis     Stroke      Overnight: Please see hospital course above.     History reviewed. No pertinent surgical " history.    Review of patient's allergies indicates:  No Known Allergies    Family History     None        Tobacco Use    Smoking status: Not on file   Substance and Sexual Activity    Alcohol use: Not on file    Drug use: Not on file    Sexual activity: Not on file      Review of Systems   Constitutional: Negative for activity change, appetite change, fatigue and fever.   HENT: Negative for congestion and sore throat.    Gastrointestinal: Positive for anal bleeding. Negative for abdominal distention, abdominal pain, diarrhea and nausea.   Genitourinary: Negative for dysuria.   Skin: Positive for pallor.   Neurological: Negative for dizziness, weakness and light-headedness.   Psychiatric/Behavioral: Negative for agitation.     Objective:     Vital Signs (Most Recent):  Temp: 96.3 °F (35.7 °C) (02/03/19 0200)  Pulse: 85 (02/03/19 0200)  Resp: 15 (02/03/19 0200)  BP: 99/62 (02/03/19 0200)  SpO2: (!) 92 % (02/03/19 0200) Vital Signs (24h Range):  Temp:  [95.4 °F (35.2 °C)-98.6 °F (37 °C)] 96.3 °F (35.7 °C)  Pulse:  [62-95] 85  Resp:  [15-85] 15  SpO2:  [89 %-100 %] 92 %  BP: ()/(42-66) 99/62   Weight: 49.9 kg (110 lb 0.2 oz)  Body mass index is 17.23 kg/m².      Intake/Output Summary (Last 24 hours) at 2/3/2019 0206  Last data filed at 2/3/2019 0200  Gross per 24 hour   Intake 2584.11 ml   Output 2075 ml   Net 509.11 ml       Physical Exam   Constitutional: He appears well-developed. No distress.   HENT:   Head: Normocephalic and atraumatic.   Eyes: EOM are normal. Pupils are equal, round, and reactive to light.   Neck: Normal range of motion. Neck supple.   Cardiovascular: Normal rate and regular rhythm.   Pulmonary/Chest: Effort normal and breath sounds normal.   Abdominal: Soft. Bowel sounds are normal. He exhibits no distension.   JIMY with tarry black blood.    Musculoskeletal: Normal range of motion. He exhibits edema (bilateral 1+ pitting edema ).   Neurological: He is alert.   Skin: Skin is warm and  dry. He is not diaphoretic. There is pallor.   Psychiatric: He has a normal mood and affect. His behavior is normal. Judgment and thought content normal.       Vents:  Oxygen Concentration (%): 50 (02/02/19 1708)  Lines/Drains/Airways     Drain                 Urethral Catheter 02/02/19 0315 Temperature probe less than 1 day          Peripheral Intravenous Line                 Peripheral IV - Single Lumen 02/02/19 0149 Left Forearm 1 day         Peripheral IV - Single Lumen 02/02/19 0150 Right Forearm 1 day              Significant Labs:    CBC/Anemia Profile:  Recent Labs   Lab 02/02/19  0831 02/02/19  1842 02/03/19  0101   WBC 14.84* 11.38 10.65   HGB 11.3* 9.9* 11.4*   HCT 35.7* 31.6* 37.2*   PLT 62* 73* 103*   MCV 90 91 94   RDW 17.1* 17.2* 17.9*        Chemistries:  Recent Labs   Lab 02/02/19  0157      K 4.0   *   CO2 21*   BUN 45*   CREATININE 0.7   CALCIUM 7.3*   ALBUMIN 1.5*   PROT 3.5*   BILITOT 0.7   ALKPHOS 57   ALT 10   AST 15           Significant Imaging: I have reviewed all pertinent imaging results/findings within the past 24 hours.  I have reviewed and interpreted all pertinent imaging results/findings within the past 24 hours.      ABG  No results for input(s): PH, PO2, PCO2, HCO3, BE in the last 168 hours.  Assessment/Plan:     Pulmonary   COPD (chronic obstructive pulmonary disease)    - Patient with history of COPD   - He states he wears oxygen but unable to quantify how many litres  - Duo- nebs PRN      Cardiac/Vascular   Hyperlipidemia    - Continue home statin     Renal/   BPH (benign prostatic hyperplasia)    - Continue home finasteride      GI   * GI bleed    - Patient with dark blood in his pad and JIMY. Do not know if patient is on NSAIDs , but he does take ASA no other anticoagulants  - GI: EGD showing gastritis/duodenitis and duodenal ulcer   - 40mg IV PPI BID  - repeat hgb decreased from 11.4 to 9.3. Patient passed a quarter sized clot and has been persistently  hypotensive requiring levophed support.   - IR consulted, CTA GI protocol ordered.   - MAP< 65, given 500cc without improvement   - Will continue resuscitative measures and keep hgb > 7   - Maintain two bore IV's  - CBC q4 for now  - MAP >65  - Type and screened s/p 3U RBC in ED  - hold all anticoagulation and aspirin   - CTA performed, no active bleeding seen  - Continue PPI gtt, NPO       Critical secondary to Patient has a condition that poses threat to life and bodily function: GI Bleed.      Critical care was time spent personally by me on the following activities: development of treatment plan with patient or surrogate and bedside caregivers, discussions with consultants, evaluation of patient's response to treatment, examination of patient, ordering and performing treatments and interventions, ordering and review of laboratory studies, ordering and review of radiographic studies, pulse oximetry, re-evaluation of patient's condition. This critical care time did not overlap with that of any other provider or involve time for any procedures.     Joe Parker MD  Critical Care Medicine  [facility]

## 2019-02-03 NOTE — CONSULTS
Radiology Consult    Krish Blackburn is a 87 y.o. male with a history of upper GI bleed and duodenal ulcer. ICU team reports abrupt   drop in hemoglobin and blood pressure requiring pressors. GI recommending IR consult.     Past Medical History:   Diagnosis Date    Anemia     Arthritis     BPH (benign prostatic hyperplasia)     Cataract     COPD (chronic obstructive pulmonary disease)     Dysphagia     Emphysema lung     GERD (gastroesophageal reflux disease)     Heart failure     Hyperlipidemia     Hypertension     Hypokalemia     MI (myocardial infarction)     Osteoarthritis     Stroke      History reviewed. No pertinent surgical history.    Discussed with primary team.     Case reviewed with Radiology staff, Dr. Staley.     Procedure: IR angiogram     Scheduled Meds:    finasteride  5 mg Oral Daily    pantoprazole  40 mg Intravenous BID    simvastatin  20 mg Oral QHS     Continuous Infusions:    norepinephrine bitartrate-D5W 0.04 mcg/kg/min (02/03/19 0600)     PRN Meds:sodium chloride, sodium chloride 0.9%, sodium chloride 0.9%    Allergies: Review of patient's allergies indicates:  No Known Allergies    Labs:  Recent Labs   Lab 02/02/19  0157   INR 1.7*       Recent Labs   Lab 02/03/19  0355   WBC 8.29   HGB 9.3*   HCT 30.3*   MCV 92   PLT 79*      Recent Labs   Lab 02/03/19  0355      *   K 3.4*   *   CO2 26   BUN 30*   CREATININE 0.7   CALCIUM 8.1*   MG 1.7   ALT 11   AST 16   ALBUMIN 2.1*   BILITOT 1.1*         Vitals (Most Recent):  Temp: 97.7 °F (36.5 °C) (02/03/19 0600)  Pulse: 81 (02/03/19 0600)  Resp: 17 (02/03/19 0600)  BP: 108/66 (02/03/19 0600)  SpO2: 96 % (02/03/19 0600)    Assessment/Plan:   - CTA negative for active GI bleed and therefore target for angiogram    - If evidence of re-bleed, recommend repeating CTA     Tejas Euceda MD  Department of Radiology   PGY III  [phone]

## 2019-02-03 NOTE — ANESTHESIA POSTPROCEDURE EVALUATION
"Anesthesia Post Evaluation    Patient: Krish Blackburn    Procedure(s) Performed: Procedure(s) (LRB):  EGD (ESOPHAGOGASTRODUODENOSCOPY) (N/A)    Final Anesthesia Type: general  Patient location during evaluation: PACU  Patient participation: Yes- Able to Participate  Level of consciousness: lethargic  Post-procedure vital signs: reviewed and stable  Pain management: adequate  Airway patency: patent  PONV status at discharge: No PONV  Anesthetic complications: no      Cardiovascular status: stable  Respiratory status: unassisted  Hydration status: euvolemic  Follow-up not needed.        Visit Vitals  BP (!) 91/51 (BP Location: Left arm, Patient Position: Lying)   Pulse 61   Temp 36 °C (96.8 °F) (Core Bladder)   Resp 14   Ht 5' 7" (1.702 m)   Wt 49.9 kg (110 lb 0.2 oz)   SpO2 (!) 93%   BMI 17.23 kg/m²       Pain/Donna Score: Donna Score: 8 (2/2/2019  8:30 PM)        "

## 2019-02-03 NOTE — TREATMENT PLAN
EGD done today    Impression:           - LA Grade D reflux esophagitis.                        - Esophagogastric landmarks identified.                        - Gastritis.                        - Duodenitis.                        - One duodenal ulcer with a flat pigmented spot                         (Tad Class IIc).                        - Acquired duodenal stenosis.                        - Duodenal mucosal atrophy.                        - No specimens collected.  Recommendation:       - Return patient to ICU for ongoing care.                        - NPO.                        - Continue present medications.                        - Use a proton pump inhibitor IV continuous                         infusion 72 hours.

## 2019-02-03 NOTE — TREATMENT PLAN
GI Treatment Plan    Krish Blackburn is a 87 y.o. male admitted to hospital 2/2/2019 (Hospital Day: 2) due to GI bleed.     Interval History  S/p EGD on 2/2 with duodenal ulcer, too large for endoscopic intervention.  More melena overnight with drop in Hb from 11---> 9  CTA was negative for signs of bleeding.    Objective  Temp:  [95.7 °F (35.4 °C)-97.9 °F (36.6 °C)] 97.9 °F (36.6 °C) (02/03 1400)  Pulse:  [60-95] 72 (02/03 1600)  BP: ()/(48-76) 113/55 (02/03 1600)  Resp:  [14-25] 18 (02/03 1600)  SpO2:  [89 %-100 %] 96 % (02/03 1600)      Laboratory    Recent Labs   Lab 02/03/19  0101 02/03/19  0355 02/03/19  1103   HGB 11.4* 9.3* 9.8*       Lab Results   Component Value Date    WBC 8.79 02/03/2019    HGB 9.8 (L) 02/03/2019    HCT 32.2 (L) 02/03/2019    MCV 93 02/03/2019    PLT 87 (L) 02/03/2019       Lab Results   Component Value Date     (H) 02/03/2019    K 3.4 (L) 02/03/2019     (H) 02/03/2019    CO2 26 02/03/2019    BUN 30 (H) 02/03/2019    CREATININE 0.7 02/03/2019    CALCIUM 8.1 (L) 02/03/2019    ANIONGAP 8 02/03/2019    ESTGFRAFRICA >60.0 02/03/2019    EGFRNONAA >60.0 02/03/2019       Lab Results   Component Value Date    ALT 11 02/03/2019    AST 16 02/03/2019    ALKPHOS 66 02/03/2019    BILITOT 1.1 (H) 02/03/2019       Lab Results   Component Value Date    INR 1.7 (H) 02/02/2019       Plan  Please continue to monitor at this time. No signs of profuse bleeding.  Continue IV PPI drip for total of 72 hours.  Can advance diet as tolerated.  Please notify GI promptly with signs of profuse bleeding  - Plan of care was discussed with primary team.  - We will continue to follow.    Thank you for involving us in the care of Krish Blackburn. Please call with any additional questions, concerns or changes in the patient's clinical status.    Tal Lisa MD  Gastroenterology Fellow, PGY IV  Spectralink: [phone]

## 2019-02-03 NOTE — PLAN OF CARE
Problem: Occupational Therapy Goal  Goal: Occupational Therapy Goal  Goals to be met by: 2/13     Patient will increase functional independence with ADLs by performing:    UE Dressing with Moderate Assistance.  LE Dressing with Maximum Assistance.  Grooming while seated with Stand-by Assistance.  Toileting from bedside commode with Maximum Assistance for hygiene and clothing management.   Sitting at edge of bed x15 minutes with Stand-by Assistance.  Rolling to Bilateral with Supervision.   Supine to sit with Minimal Assistance.  Stand pivot transfers with Minimal Assistance.    Outcome: Ongoing (interventions implemented as appropriate)  OT eval completed.

## 2019-02-03 NOTE — NURSING
MAP below 65 sustained. Levo started per orders. SEE V/S. See MAR. Pt does not have a central line at this time. Dr. Stephanie MD notified and aware that Levo will be infusing through a peripheral IV at this time. MD states that she will follow up with pt and pt needs at a later time.

## 2019-02-04 LAB
ALBUMIN SERPL BCP-MCNC: 1.9 G/DL
ALP SERPL-CCNC: 62 U/L
ALT SERPL W/O P-5'-P-CCNC: 10 U/L
ANION GAP SERPL CALC-SCNC: 9 MMOL/L
AST SERPL-CCNC: 18 U/L
BASOPHILS # BLD AUTO: 0.01 K/UL
BASOPHILS # BLD AUTO: 0.02 K/UL
BASOPHILS NFR BLD: 0.1 %
BASOPHILS NFR BLD: 0.2 %
BILIRUB SERPL-MCNC: 0.9 MG/DL
BUN SERPL-MCNC: 22 MG/DL
CALCIUM SERPL-MCNC: 8.1 MG/DL
CHLORIDE SERPL-SCNC: 111 MMOL/L
CO2 SERPL-SCNC: 23 MMOL/L
CREAT SERPL-MCNC: 0.6 MG/DL
DIFFERENTIAL METHOD: ABNORMAL
EOSINOPHIL # BLD AUTO: 0 K/UL
EOSINOPHIL # BLD AUTO: 0.1 K/UL
EOSINOPHIL NFR BLD: 0.1 %
EOSINOPHIL NFR BLD: 0.2 %
EOSINOPHIL NFR BLD: 0.4 %
EOSINOPHIL NFR BLD: 0.5 %
ERYTHROCYTE [DISTWIDTH] IN BLOOD BY AUTOMATED COUNT: 18 %
ERYTHROCYTE [DISTWIDTH] IN BLOOD BY AUTOMATED COUNT: 18.2 %
ERYTHROCYTE [DISTWIDTH] IN BLOOD BY AUTOMATED COUNT: 18.3 %
ERYTHROCYTE [DISTWIDTH] IN BLOOD BY AUTOMATED COUNT: 18.3 %
EST. GFR  (AFRICAN AMERICAN): >60 ML/MIN/1.73 M^2
EST. GFR  (NON AFRICAN AMERICAN): >60 ML/MIN/1.73 M^2
GLUCOSE SERPL-MCNC: 102 MG/DL
HCT VFR BLD AUTO: 27.6 %
HCT VFR BLD AUTO: 29.2 %
HCT VFR BLD AUTO: 29.3 %
HCT VFR BLD AUTO: 30.1 %
HGB BLD-MCNC: 8.7 G/DL
HGB BLD-MCNC: 9 G/DL
HGB BLD-MCNC: 9.1 G/DL
HGB BLD-MCNC: 9.6 G/DL
IMM GRANULOCYTES # BLD AUTO: 0.04 K/UL
IMM GRANULOCYTES # BLD AUTO: 0.05 K/UL
IMM GRANULOCYTES NFR BLD AUTO: 0.4 %
IMM GRANULOCYTES NFR BLD AUTO: 0.5 %
IMM GRANULOCYTES NFR BLD AUTO: 0.5 %
IMM GRANULOCYTES NFR BLD AUTO: 0.6 %
LYMPHOCYTES # BLD AUTO: 0.2 K/UL
LYMPHOCYTES # BLD AUTO: 0.2 K/UL
LYMPHOCYTES # BLD AUTO: 0.4 K/UL
LYMPHOCYTES # BLD AUTO: 0.6 K/UL
LYMPHOCYTES NFR BLD: 1.9 %
LYMPHOCYTES NFR BLD: 3.4 %
LYMPHOCYTES NFR BLD: 5.8 %
LYMPHOCYTES NFR BLD: 5.9 %
MAGNESIUM SERPL-MCNC: 1.6 MG/DL
MCH RBC QN AUTO: 28.1 PG
MCH RBC QN AUTO: 28.4 PG
MCH RBC QN AUTO: 29.6 PG
MCH RBC QN AUTO: 29.7 PG
MCHC RBC AUTO-ENTMCNC: 30.7 G/DL
MCHC RBC AUTO-ENTMCNC: 31.2 G/DL
MCHC RBC AUTO-ENTMCNC: 31.5 G/DL
MCHC RBC AUTO-ENTMCNC: 31.9 G/DL
MCV RBC AUTO: 90 FL
MCV RBC AUTO: 92 FL
MCV RBC AUTO: 93 FL
MCV RBC AUTO: 94 FL
MONOCYTES # BLD AUTO: 0.6 K/UL
MONOCYTES # BLD AUTO: 0.7 K/UL
MONOCYTES # BLD AUTO: 0.8 K/UL
MONOCYTES # BLD AUTO: 1.1 K/UL
MONOCYTES NFR BLD: 11 %
MONOCYTES NFR BLD: 8.5 %
MONOCYTES NFR BLD: 9 %
MONOCYTES NFR BLD: 9.8 %
NEUTROPHILS # BLD AUTO: 5.9 K/UL
NEUTROPHILS # BLD AUTO: 6.2 K/UL
NEUTROPHILS # BLD AUTO: 8.1 K/UL
NEUTROPHILS # BLD AUTO: 8.6 K/UL
NEUTROPHILS NFR BLD: 82 %
NEUTROPHILS NFR BLD: 83.3 %
NEUTROPHILS NFR BLD: 86.8 %
NEUTROPHILS NFR BLD: 88.9 %
NRBC BLD-RTO: 0 /100 WBC
PHOSPHATE SERPL-MCNC: 2.4 MG/DL
PLATELET # BLD AUTO: 104 K/UL
PLATELET # BLD AUTO: 74 K/UL
PLATELET # BLD AUTO: 85 K/UL
PLATELET # BLD AUTO: 98 K/UL
PMV BLD AUTO: 10.7 FL
PMV BLD AUTO: 11 FL
PMV BLD AUTO: 11 FL
PMV BLD AUTO: 11.2 FL
POCT GLUCOSE: 93 MG/DL (ref 70–110)
POTASSIUM SERPL-SCNC: 3.5 MMOL/L
PROT SERPL-MCNC: 4.3 G/DL
RBC # BLD AUTO: 2.94 M/UL
RBC # BLD AUTO: 3.17 M/UL
RBC # BLD AUTO: 3.23 M/UL
RBC # BLD AUTO: 3.24 M/UL
SODIUM SERPL-SCNC: 143 MMOL/L
WBC # BLD AUTO: 6.79 K/UL
WBC # BLD AUTO: 7.47 K/UL
WBC # BLD AUTO: 9.67 K/UL
WBC # BLD AUTO: 9.91 K/UL

## 2019-02-04 PROCEDURE — 99233 SBSQ HOSP IP/OBS HIGH 50: CPT | Mod: ,,, | Performed by: INTERNAL MEDICINE

## 2019-02-04 PROCEDURE — 76937 US GUIDE VASCULAR ACCESS: CPT

## 2019-02-04 PROCEDURE — 36410 VNPNXR 3YR/> PHY/QHP DX/THER: CPT

## 2019-02-04 PROCEDURE — 83735 ASSAY OF MAGNESIUM: CPT

## 2019-02-04 PROCEDURE — C1751 CATH, INF, PER/CENT/MIDLINE: HCPCS

## 2019-02-04 PROCEDURE — 93010 EKG 12-LEAD: ICD-10-PCS | Mod: ,,, | Performed by: INTERNAL MEDICINE

## 2019-02-04 PROCEDURE — 80053 COMPREHEN METABOLIC PANEL: CPT

## 2019-02-04 PROCEDURE — 99233 PR SUBSEQUENT HOSPITAL CARE,LEVL III: ICD-10-PCS | Mod: ,,, | Performed by: INTERNAL MEDICINE

## 2019-02-04 PROCEDURE — 25000242 PHARM REV CODE 250 ALT 637 W/ HCPCS

## 2019-02-04 PROCEDURE — 85025 COMPLETE CBC W/AUTO DIFF WBC: CPT | Mod: 91

## 2019-02-04 PROCEDURE — 93010 ELECTROCARDIOGRAM REPORT: CPT | Mod: ,,, | Performed by: INTERNAL MEDICINE

## 2019-02-04 PROCEDURE — 94761 N-INVAS EAR/PLS OXIMETRY MLT: CPT

## 2019-02-04 PROCEDURE — 27000221 HC OXYGEN, UP TO 24 HOURS

## 2019-02-04 PROCEDURE — 63600175 PHARM REV CODE 636 W HCPCS

## 2019-02-04 PROCEDURE — 93005 ELECTROCARDIOGRAM TRACING: CPT

## 2019-02-04 PROCEDURE — 20000000 HC ICU ROOM

## 2019-02-04 PROCEDURE — 84100 ASSAY OF PHOSPHORUS: CPT

## 2019-02-04 PROCEDURE — 25000003 PHARM REV CODE 250: Performed by: INTERNAL MEDICINE

## 2019-02-04 PROCEDURE — C9113 INJ PANTOPRAZOLE SODIUM, VIA: HCPCS

## 2019-02-04 PROCEDURE — 25000003 PHARM REV CODE 250: Performed by: STUDENT IN AN ORGANIZED HEALTH CARE EDUCATION/TRAINING PROGRAM

## 2019-02-04 PROCEDURE — 25000003 PHARM REV CODE 250

## 2019-02-04 PROCEDURE — 94640 AIRWAY INHALATION TREATMENT: CPT

## 2019-02-04 PROCEDURE — 63600175 PHARM REV CODE 636 W HCPCS: Performed by: STUDENT IN AN ORGANIZED HEALTH CARE EDUCATION/TRAINING PROGRAM

## 2019-02-04 PROCEDURE — S0077 INJECTION, CLINDAMYCIN PHOSP: HCPCS | Performed by: STUDENT IN AN ORGANIZED HEALTH CARE EDUCATION/TRAINING PROGRAM

## 2019-02-04 PROCEDURE — 99900035 HC TECH TIME PER 15 MIN (STAT)

## 2019-02-04 RX ORDER — POTASSIUM CHLORIDE 7.45 MG/ML
10 INJECTION INTRAVENOUS
Status: DISCONTINUED | OUTPATIENT
Start: 2019-02-04 | End: 2019-02-04

## 2019-02-04 RX ORDER — LANOLIN ALCOHOL/MO/W.PET/CERES
400 CREAM (GRAM) TOPICAL ONCE
Status: DISCONTINUED | OUTPATIENT
Start: 2019-02-04 | End: 2019-02-04

## 2019-02-04 RX ORDER — CLINDAMYCIN PHOSPHATE 600 MG/50ML
600 INJECTION, SOLUTION INTRAVENOUS
Status: DISCONTINUED | OUTPATIENT
Start: 2019-02-04 | End: 2019-02-08 | Stop reason: HOSPADM

## 2019-02-04 RX ORDER — MAGNESIUM SULFATE HEPTAHYDRATE 40 MG/ML
2 INJECTION, SOLUTION INTRAVENOUS ONCE
Status: COMPLETED | OUTPATIENT
Start: 2019-02-04 | End: 2019-02-04

## 2019-02-04 RX ADMIN — IPRATROPIUM BROMIDE AND ALBUTEROL SULFATE 3 ML: .5; 3 SOLUTION RESPIRATORY (INHALATION) at 10:02

## 2019-02-04 RX ADMIN — CLINDAMYCIN IN 5 PERCENT DEXTROSE 600 MG: 12 INJECTION, SOLUTION INTRAVENOUS at 05:02

## 2019-02-04 RX ADMIN — DEXTROSE 8 MG/HR: 50 INJECTION, SOLUTION INTRAVENOUS at 02:02

## 2019-02-04 RX ADMIN — CLINDAMYCIN IN 5 PERCENT DEXTROSE 600 MG: 12 INJECTION, SOLUTION INTRAVENOUS at 11:02

## 2019-02-04 RX ADMIN — DEXTROSE 8 MG/HR: 50 INJECTION, SOLUTION INTRAVENOUS at 01:02

## 2019-02-04 RX ADMIN — DEXTROSE 8 MG/HR: 50 INJECTION, SOLUTION INTRAVENOUS at 07:02

## 2019-02-04 RX ADMIN — MAGNESIUM SULFATE IN WATER 2 G: 40 INJECTION, SOLUTION INTRAVENOUS at 12:02

## 2019-02-04 RX ADMIN — POTASSIUM PHOSPHATE, MONOBASIC AND POTASSIUM PHOSPHATE, DIBASIC 15 MMOL: 224; 236 INJECTION, SOLUTION, CONCENTRATE INTRAVENOUS at 01:02

## 2019-02-04 RX ADMIN — DEXTROSE 8 MG/HR: 50 INJECTION, SOLUTION INTRAVENOUS at 06:02

## 2019-02-04 NOTE — PLAN OF CARE
Problem: Adult Inpatient Plan of Care  Goal: Plan of Care Review  Received report from night CMICU RN. Assessmet follows    Daughter called: Name and number up on board. Pt is on 4L via N/C as of 2 years ago, believed higher by now per daughter.     H/H: 9.8/32.2 to 9.1/29.3    Pulmonary: Pt breathing spontaneously with good airway. SpO2 drop to low 80's with any patient movement, talking noted.   Lungs coarse, regular non-productive cough noted that the pt states is baseline.  Pt NAD  Breathing treatment given per PRN orders, minimal effect. [address] team notified,   VO for ABG with pH 7.4, CO2 37.6, PO2 37 HCO3 23.3, Team aware, no new orders    Cardiovascular:Start of shift: .04mcg/kg/min levo, stable BP with NSR 70's Pt cristóbal to 50's with temp to 35.9 via bladder temp over several hours without complaint, lindsay hugger applied. Gradual rise to 37-, guzman removed. Continued oral temps: 97's. 27100 team aware. HR to 70's in afternoon.    Titrated off levo, MAP above 65 several hours, dropping to 62, team aware. Order for 500mL LR bolus given without change, team aware. Ok to restart levo.  0.01 started with effect.   Pulses 2+ in upper extremities, doppler only in lower  Motor, sensation, warmth: all OK  CTA: no active bleeding    Neurological: Oriented to person, immediate environment, not beyond.   No focal neuro deficits,   speech difficult to understand d/t lack of dentures but understandable.  Per daughter: At neuro baseline    Gastrointestinal: NPO except ice chips.   Scoped night before without intervention  One melanous small BM during shift. No complaints of abd pain, nausea, vomiting, pain. VSS.  Bowel sounds not hyperactive, no distention/ tenderness    Genitourinary: Guzman removed approx 1200, condom catheter applied. Minimal production (40mL/several hours), little per bladder scan. Team aware, no new orders.    Integumentary/Other: Scrotum skin raw, cleaned. No issue with condom catheter. Wedged q2,  sacral/ankle dressing in place.    Infusions: Protonix: 8mg/hour, Levo .01mcg/kg/min    Access: L AC 18: ED line, good flow, blood around site  L forearm 18: Good flow, blood around site  R forearm: Placed during a blood draw, blood around site despite dressing change.        Patient progressing towards goals as tolerated, plan of care communicated and reviewed with Krish Blackburn and family. All concerns addressed. Will continue to monitor.

## 2019-02-04 NOTE — PLAN OF CARE
Shaun Zapata MD  [address] [place] [address] / [facility] [address]     No Pharmacies Listed     Payor: MEDICAID / Plan: MEDICAID OF [facility] / Product Type: Government /     No future appointments.    Extended Emergency Contact Information  Primary Emergency Contact: Ruby Jalilbelen  Mobile Phone: [phone]  Relation: Other        02/04/19 1203   Discharge Assessment   Assessment Type Discharge Planning Assessment   Confirmed/corrected address and phone number on facesheet? No   Assessment information obtained from? Medical Record   Expected Length of Stay (days) 3   Communicated expected length of stay with patient/caregiver no   Prior to hospitilization cognitive status: Unable to Assess   Prior to hospitalization functional status: Assistive Equipment;Needs Assistance   Current cognitive status: Not Oriented to Time;Not Oriented to Place   Current Functional Status: Assistive Equipment;Needs Assistance   Facility Arrived From: ED admit   Lives With facility resident  ([facility])   Able to Return to Prior Arrangements yes   Is patient able to care for self after discharge? No   Who are your caregiver(s) and their phone number(s)? Dru Beltran (daughter) [phone]   Patient's perception of discharge disposition nursing home   Readmission Within the Last 30 Days no previous admission in last 30 days   Patient currently being followed by outpatient case management? No   Patient currently receives any other outside agency services? No   Equipment Currently Used at Home other (see comments);oxygen  (NH facility equipment)   Brigitte Frazier RN, BSN, CCM  Case Management  [facility]  Ext. [phone]

## 2019-02-04 NOTE — PROGRESS NOTES
Placed pt on ventimask 50% due to 02 sat pulse ox not reading well. O2 sat finally read with good wave form at 96% the original o2sat reading when on high flow 6 lpm 15 min earlier. Doctor evaluated the pt and agreed for us to place the pt back on high flow nasal cannula at 6 lpm. Will continue to monitor pt and will give duoneb prn tx per doctor orders

## 2019-02-04 NOTE — PROGRESS NOTES
"[facility]  Critical Care Medicine  Progress Note    Patient Name: Krish Blackburn  MRN: [id number]  Admission Date: 2/2/2019  Hospital Length of Stay: 2 days  Code Status: Full Code  Attending Provider: Fredy Lopez*  Primary Care Provider: No primary care provider on file.   Principal Problem: GI bleed    Subjective:     HPI:  Mr. Krish Blackburn is a 87 y.o. with a PMH of HLD, COPD ( on home oxygen) who presented from NH for GI bleed. When asking the patient he kept saying " I do not know why I am here." In the ED patient found to have a hgb of 5.8, INR of 1.7, and BUN of 45. He was hypotensive with SBPs in the 70 . He denied any SOB, lightheadedness, palpitation or chest pain. When examined he had melena on the padding. He was oriented to self and place.     In the ED he was given 3 units of blood and MICU was consulted for admission. GI was consulted in the ED     Can not determine if/when patient had a colonoscopy or endoscopy     Hospital/ICU Course:  Patient admitted to MICU for close monitoring. EGD performed the day of presentation showed Grade D reflux esophagitis, duodenitis, and a large duodenal ulcer which was not amenable to intervention.     Overnight, patient had two melanotic bowel movements and hemoglobin dropped from 11.4 to 9.3. Per GI recs, IR was consulted and CTA performed; however, no active bleeding seen on the scan. Therefore, no embolization possible. Patient started on PPI drip and kept NPO per GI recs.     This morning, patient had no episodes of bleeding overnight, BP remained stable with no need for pressors. Will advance diet to clear liquids and plan for possible step down to hospital medicine.     Interval History/Significant Events: No issues overnight, no complaints this morning.     Review of Systems   Constitutional: Positive for fatigue. Negative for fever.   HENT: Negative for sore throat and trouble swallowing.    Eyes: Negative for visual " disturbance.   Respiratory: Negative for cough and shortness of breath.    Cardiovascular: Negative for chest pain.   Gastrointestinal: Positive for blood in stool (scant amounts of melanotic stool per nursing). Negative for constipation, diarrhea, nausea and vomiting.   Endocrine: Negative for polydipsia, polyphagia and polyuria.   Genitourinary: Negative for dysuria.   Musculoskeletal: Negative for arthralgias.   Skin: Negative for wound.   Neurological: Negative for headaches.   Psychiatric/Behavioral: Negative for decreased concentration and dysphoric mood.     Objective:     Vital Signs (Most Recent):  Temp: 97.6 °F (36.4 °C) (02/04/19 0700)  Pulse: 66 (02/04/19 0700)  Resp: 18 (02/04/19 0700)  BP: 117/62 (02/04/19 0700)  SpO2: 100 % (02/04/19 0700) Vital Signs (24h Range):  Temp:  [96.6 °F (35.9 °C)-97.9 °F (36.6 °C)] 97.6 °F (36.4 °C)  Pulse:  [60-73] 66  Resp:  [10-28] 18  SpO2:  [89 %-100 %] 100 %  BP: ()/(51-68) 117/62   Weight: 49.9 kg (110 lb 0.2 oz)  Body mass index is 17.23 kg/m².      Intake/Output Summary (Last 24 hours) at 2/4/2019 0813  Last data filed at 2/4/2019 0700  Gross per 24 hour   Intake 1136.41 ml   Output 370 ml   Net 766.41 ml       Physical Exam   Constitutional: He is oriented to person, place, and time.   Elderly, frail, chronically-ill appearing man in no apparent distress.    HENT:   Mouth/Throat: Oropharynx is clear and moist. No oropharyngeal exudate.   Scab noted on nose   Eyes: Conjunctivae are normal. No scleral icterus.   Neck: Normal range of motion. Neck supple.   Cardiovascular: Normal rate, regular rhythm, normal heart sounds and intact distal pulses.   Pulmonary/Chest: Effort normal and breath sounds normal. No respiratory distress.   Abdominal: Soft. He exhibits no distension. There is no tenderness.   Musculoskeletal: He exhibits no edema.   Neurological: He is alert and oriented to person, place, and time.   Nursing note and vitals reviewed.      Vents:  Oxygen  Concentration (%): 36 (02/04/19 0330)  Lines/Drains/Airways     Drain            Male External Urinary Catheter 02/03/19 1200 Small less than 1 day          Peripheral Intravenous Line                 Peripheral IV - Single Lumen 02/03/19 2300 Right Forearm less than 1 day         Peripheral IV - Single Lumen 02/03/19 2300 Right Upper Arm less than 1 day              Significant Labs:    CBC/Anemia Profile:  Recent Labs   Lab 02/03/19  1758 02/03/19  2359 02/04/19  0719   WBC 8.47 9.91 7.47   HGB 9.1* 9.1* 9.0*   HCT 29.3* 29.2* 29.3*   PLT 92* 74* 85*   MCV 92 90 92   RDW 18.1* 18.2* 18.3*        Chemistries:  Recent Labs   Lab 02/03/19  0355 02/04/19  0300   * 143   K 3.4* 3.5   * 111*   CO2 26 23   BUN 30* 22   CREATININE 0.7 0.6   CALCIUM 8.1* 8.1*   ALBUMIN 2.1* 1.9*   PROT 4.5* 4.3*   BILITOT 1.1* 0.9   ALKPHOS 66 62   ALT 11 10   AST 16 18   MG 1.7 1.6   PHOS 2.7 2.4*       Significant Imaging:  I have reviewed and interpreted all pertinent imaging results/findings within the past 24 hours.      ABG  Recent Labs   Lab 02/03/19  1736   PH 7.400   PO2 37*   PCO2 37.6   HCO3 23.3*   BE -1     Assessment/Plan:     Pulmonary   COPD (chronic obstructive pulmonary disease)    - Patient with history of COPD   - He states he wears oxygen but unable to quantify how many liters  - Duo- nebs PRN      Cardiac/Vascular   Hyperlipidemia    - Continue home statin     Renal/   BPH (benign prostatic hyperplasia)    - Continue home finasteride      GI   * GI bleed    - At presentation, patient with dark blood in his pad and JIMY. Do not know if patient is on NSAIDs , but he does take ASA no other anticoagulants  - S/p 3 u PRBC in ED  - EGD performed day of presentation. Revealed gastritis/duodenitis and [place] Class IIc duodenal ulcer with flat pigmented spot, too large for intervention  - After drop in hemoglobin following EGD, CTA performed, no active bleeding seen therefore embolization not possible  -  Hemoglobin has remained stable since CTA  - Now on PPI gtt x 72 hours  - Advancing diet as tolerated  - CBC q8h        Critical Care Daily Checklist:    A: Awake: RASS Goal/Actual Goal: RASS Goal: 0-->alert and calm  Actual: [place] Agitation Sedation Scale (RASS): Alert and calm   B: Spontaneous Breathing Trial Performed?     C: SAT & SBT Coordinated?  N/a                      D: Delirium: CAM-ICU Overall CAM-ICU: Negative   E: Early Mobility Performed? Yes   F: Feeding Goal:    Status:     Current Diet Order   Procedures    Diet clear liquid      AS: Analgesia/Sedation N/a   T: Thromboembolic Prophylaxis Held due to active bleeding   H: HOB > 300 Yes   U: Stress Ulcer Prophylaxis (if needed) PPI gtt   G: Glucose Control N/a   B: Bowel Function Stool Occurrence: 1   I: Indwelling Catheter (Lines & Hurley) Necessity PIV x 2, condom cath, incision   D: De-escalation of Antimicrobials/Pharmacotherapies N/a    Plan for the day/ETD Step down to hospital medicine if stable    Code Status:  Family/Goals of Care: Full Code         Critical secondary to Patient has a condition that poses threat to life and bodily function: GI bleed      Critical care was time spent personally by me on the following activities: development of treatment plan with patient or surrogate and bedside caregivers, discussions with consultants, evaluation of patient's response to treatment, examination of patient, ordering and performing treatments and interventions, ordering and review of laboratory studies, ordering and review of radiographic studies, pulse oximetry, re-evaluation of patient's condition. This critical care time did not overlap with that of any other provider or involve time for any procedures.     Joe Parker MD  Critical Care Medicine  [facility]

## 2019-02-04 NOTE — PLAN OF CARE
Problem: Adult Inpatient Plan of Care  Goal: Plan of Care Review  Outcome: Ongoing (interventions implemented as appropriate)  No acute events throughout shift. VS Stable. Map>65. Pt. IV sites replaced d/t patency issues, blood leaking around cannula during pt movement. Team Aware. Pt disoriented to place. Pt remains on NC. Minimal UOP. 2 Small black BM on shift. Protonix gtt continued. Pt progressing towards goal as tolerated. Plan of care reviewed with patient. All questions and concerns addressed. WCTM.

## 2019-02-04 NOTE — CONSULTS
Midline placed in left brachial vein, 20g x 8cm size.  Max dwell date 3/5/2019.  Lot# [id number].  Needle advanced using realtime u/s guidance.    Removed 20g 1 PIV to RFA    Applied mepilex dressing to RFA

## 2019-02-04 NOTE — ASSESSMENT & PLAN NOTE
- Patient with history of COPD   - He states he wears oxygen but unable to quantify how many liters  - Duo- xavier SALESN

## 2019-02-04 NOTE — PLAN OF CARE
Problem: Adult Inpatient Plan of Care  Goal: Plan of Care Review  Outcome: Ongoing (interventions implemented as appropriate)  Pt O2 requirements have increased today. 2 bloody(maroon) BM noted, both small. Pt noted to cough after given sips for meds this am. MD notified and order for SLP put in. Pt again NPO. Otherwise,  VS and assessment per flow sheet, patient progressing towards goal as tolerated. Plan of care reviewed with Krish Blackburn,  all concerns addressed. Will continue to monitor.

## 2019-02-04 NOTE — SUBJECTIVE & OBJECTIVE
Interval History/Significant Events: No issues overnight, no complaints this morning.     Review of Systems   Constitutional: Positive for fatigue. Negative for fever.   HENT: Negative for sore throat and trouble swallowing.    Eyes: Negative for visual disturbance.   Respiratory: Negative for cough and shortness of breath.    Cardiovascular: Negative for chest pain.   Gastrointestinal: Positive for blood in stool (scant amounts of melanotic stool per nursing). Negative for constipation, diarrhea, nausea and vomiting.   Endocrine: Negative for polydipsia, polyphagia and polyuria.   Genitourinary: Negative for dysuria.   Musculoskeletal: Negative for arthralgias.   Skin: Negative for wound.   Neurological: Negative for headaches.   Psychiatric/Behavioral: Negative for decreased concentration and dysphoric mood.     Objective:     Vital Signs (Most Recent):  Temp: 97.6 °F (36.4 °C) (02/04/19 0700)  Pulse: 66 (02/04/19 0700)  Resp: 18 (02/04/19 0700)  BP: 117/62 (02/04/19 0700)  SpO2: 100 % (02/04/19 0700) Vital Signs (24h Range):  Temp:  [96.6 °F (35.9 °C)-97.9 °F (36.6 °C)] 97.6 °F (36.4 °C)  Pulse:  [60-73] 66  Resp:  [10-28] 18  SpO2:  [89 %-100 %] 100 %  BP: ()/(51-68) 117/62   Weight: 49.9 kg (110 lb 0.2 oz)  Body mass index is 17.23 kg/m².      Intake/Output Summary (Last 24 hours) at 2/4/2019 0813  Last data filed at 2/4/2019 0700  Gross per 24 hour   Intake 1136.41 ml   Output 370 ml   Net 766.41 ml       Physical Exam   Constitutional: He is oriented to person, place, and time.   Elderly, frail, chronically-ill appearing man in no apparent distress.    HENT:   Mouth/Throat: Oropharynx is clear and moist. No oropharyngeal exudate.   Scab noted on nose   Eyes: Conjunctivae are normal. No scleral icterus.   Neck: Normal range of motion. Neck supple.   Cardiovascular: Normal rate, regular rhythm, normal heart sounds and intact distal pulses.   Pulmonary/Chest: Effort normal and breath sounds normal. No  respiratory distress.   Abdominal: Soft. He exhibits no distension. There is no tenderness.   Musculoskeletal: He exhibits no edema.   Neurological: He is alert and oriented to person, place, and time.   Nursing note and vitals reviewed.      Vents:  Oxygen Concentration (%): 36 (02/04/19 0330)  Lines/Drains/Airways     Drain            Male External Urinary Catheter 02/03/19 1200 Small less than 1 day          Peripheral Intravenous Line                 Peripheral IV - Single Lumen 02/03/19 2300 Right Forearm less than 1 day         Peripheral IV - Single Lumen 02/03/19 2300 Right Upper Arm less than 1 day              Significant Labs:    CBC/Anemia Profile:  Recent Labs   Lab 02/03/19  1758 02/03/19  2359 02/04/19  0719   WBC 8.47 9.91 7.47   HGB 9.1* 9.1* 9.0*   HCT 29.3* 29.2* 29.3*   PLT 92* 74* 85*   MCV 92 90 92   RDW 18.1* 18.2* 18.3*        Chemistries:  Recent Labs   Lab 02/03/19  0355 02/04/19  0300   * 143   K 3.4* 3.5   * 111*   CO2 26 23   BUN 30* 22   CREATININE 0.7 0.6   CALCIUM 8.1* 8.1*   ALBUMIN 2.1* 1.9*   PROT 4.5* 4.3*   BILITOT 1.1* 0.9   ALKPHOS 66 62   ALT 11 10   AST 16 18   MG 1.7 1.6   PHOS 2.7 2.4*       Significant Imaging:  I have reviewed and interpreted all pertinent imaging results/findings within the past 24 hours.

## 2019-02-04 NOTE — ASSESSMENT & PLAN NOTE
- At presentation, patient with dark blood in his pad and JIMY. Do not know if patient is on NSAIDs , but he does take ASA no other anticoagulants  - S/p 3 u PRBC in ED  - EGD performed day of presentation. Revealed gastritis/duodenitis and [place] Class IIc duodenal ulcer with flat pigmented spot, too large for intervention  - After drop in hemoglobin following EGD, CTA performed, no active bleeding seen therefore embolization not possible  - Hemoglobin has remained stable since CTA  - Now on PPI gtt x 72 hours  - Advancing diet as tolerated  - CBC q8h

## 2019-02-04 NOTE — PT/OT/SLP PROGRESS
Physical Therapy      Patient Name:  Krish Blackburn   MRN:  [id number]    PT orders received and acknowledged. Attempted to see pt in PM. While pt answering evaluation questions, pt with multiple episodes of desaturation to ~85% with increased labored breathing. SpO2 would resolve with rest and deep breathing. Evaluation terminated 2/2 decreasing SpO2. Will re-attempt evaluation at next possible date as pt is appropriate.    Penelope Dee, PT   2/4/2019

## 2019-02-05 PROBLEM — J69.0 ASPIRATION PNEUMONIA: Status: ACTIVE | Noted: 2019-02-05

## 2019-02-05 LAB
ABO + RH BLD: NORMAL
ALBUMIN SERPL BCP-MCNC: 2 G/DL
ALP SERPL-CCNC: 64 U/L
ALT SERPL W/O P-5'-P-CCNC: 12 U/L
ANION GAP SERPL CALC-SCNC: 6 MMOL/L
ANION GAP SERPL CALC-SCNC: 9 MMOL/L
AST SERPL-CCNC: 18 U/L
BASOPHILS # BLD AUTO: 0.01 K/UL
BASOPHILS # BLD AUTO: 0.02 K/UL
BASOPHILS NFR BLD: 0.2 %
BASOPHILS NFR BLD: 0.3 %
BASOPHILS NFR BLD: 0.4 %
BILIRUB SERPL-MCNC: 0.9 MG/DL
BLD GP AB SCN CELLS X3 SERPL QL: NORMAL
BUN SERPL-MCNC: 16 MG/DL
BUN SERPL-MCNC: 18 MG/DL
CALCIUM SERPL-MCNC: 8.1 MG/DL
CALCIUM SERPL-MCNC: 8.2 MG/DL
CHLORIDE SERPL-SCNC: 108 MMOL/L
CHLORIDE SERPL-SCNC: 110 MMOL/L
CO2 SERPL-SCNC: 21 MMOL/L
CO2 SERPL-SCNC: 25 MMOL/L
CREAT SERPL-MCNC: 0.6 MG/DL
CREAT SERPL-MCNC: 0.6 MG/DL
DIFFERENTIAL METHOD: ABNORMAL
EOSINOPHIL # BLD AUTO: 0 K/UL
EOSINOPHIL NFR BLD: 0.2 %
EOSINOPHIL NFR BLD: 0.3 %
EOSINOPHIL NFR BLD: 0.4 %
EOSINOPHIL NFR BLD: 0.4 %
EOSINOPHIL NFR BLD: 0.5 %
ERYTHROCYTE [DISTWIDTH] IN BLOOD BY AUTOMATED COUNT: 18 %
ERYTHROCYTE [DISTWIDTH] IN BLOOD BY AUTOMATED COUNT: 18.5 %
ERYTHROCYTE [DISTWIDTH] IN BLOOD BY AUTOMATED COUNT: 18.5 %
ERYTHROCYTE [DISTWIDTH] IN BLOOD BY AUTOMATED COUNT: 18.7 %
ERYTHROCYTE [DISTWIDTH] IN BLOOD BY AUTOMATED COUNT: 18.8 %
EST. GFR  (AFRICAN AMERICAN): >60 ML/MIN/1.73 M^2
EST. GFR  (AFRICAN AMERICAN): >60 ML/MIN/1.73 M^2
EST. GFR  (NON AFRICAN AMERICAN): >60 ML/MIN/1.73 M^2
EST. GFR  (NON AFRICAN AMERICAN): >60 ML/MIN/1.73 M^2
GLUCOSE SERPL-MCNC: 100 MG/DL
GLUCOSE SERPL-MCNC: 84 MG/DL
HCT VFR BLD AUTO: 26.6 %
HCT VFR BLD AUTO: 27.2 %
HCT VFR BLD AUTO: 27.6 %
HCT VFR BLD AUTO: 28.6 %
HCT VFR BLD AUTO: 29.7 %
HGB BLD-MCNC: 8.2 G/DL
HGB BLD-MCNC: 8.3 G/DL
HGB BLD-MCNC: 8.6 G/DL
HGB BLD-MCNC: 9.1 G/DL
HGB BLD-MCNC: 9.5 G/DL
IMM GRANULOCYTES # BLD AUTO: 0.01 K/UL
IMM GRANULOCYTES # BLD AUTO: 0.02 K/UL
IMM GRANULOCYTES # BLD AUTO: 0.02 K/UL
IMM GRANULOCYTES # BLD AUTO: 0.03 K/UL
IMM GRANULOCYTES # BLD AUTO: 0.03 K/UL
IMM GRANULOCYTES NFR BLD AUTO: 0.3 %
IMM GRANULOCYTES NFR BLD AUTO: 0.4 %
IMM GRANULOCYTES NFR BLD AUTO: 0.5 %
IMM GRANULOCYTES NFR BLD AUTO: 0.5 %
IMM GRANULOCYTES NFR BLD AUTO: 0.6 %
LYMPHOCYTES # BLD AUTO: 0.2 K/UL
LYMPHOCYTES # BLD AUTO: 0.3 K/UL
LYMPHOCYTES # BLD AUTO: 0.4 K/UL
LYMPHOCYTES NFR BLD: 3.8 %
LYMPHOCYTES NFR BLD: 4 %
LYMPHOCYTES NFR BLD: 5.7 %
LYMPHOCYTES NFR BLD: 6.5 %
LYMPHOCYTES NFR BLD: 6.6 %
MAGNESIUM SERPL-MCNC: 2 MG/DL
MCH RBC QN AUTO: 28.9 PG
MCH RBC QN AUTO: 29.4 PG
MCH RBC QN AUTO: 29.8 PG
MCH RBC QN AUTO: 29.9 PG
MCH RBC QN AUTO: 30 PG
MCHC RBC AUTO-ENTMCNC: 30.5 G/DL
MCHC RBC AUTO-ENTMCNC: 30.8 G/DL
MCHC RBC AUTO-ENTMCNC: 31.2 G/DL
MCHC RBC AUTO-ENTMCNC: 31.8 G/DL
MCHC RBC AUTO-ENTMCNC: 32 G/DL
MCV RBC AUTO: 93 FL
MCV RBC AUTO: 94 FL
MCV RBC AUTO: 95 FL
MCV RBC AUTO: 95 FL
MCV RBC AUTO: 96 FL
MONOCYTES # BLD AUTO: 0.5 K/UL
MONOCYTES # BLD AUTO: 0.6 K/UL
MONOCYTES # BLD AUTO: 0.6 K/UL
MONOCYTES # BLD AUTO: 0.8 K/UL
MONOCYTES # BLD AUTO: 0.8 K/UL
MONOCYTES NFR BLD: 11.5 %
MONOCYTES NFR BLD: 13.1 %
MONOCYTES NFR BLD: 13.7 %
MONOCYTES NFR BLD: 14.4 %
MONOCYTES NFR BLD: 15.2 %
NEUTROPHILS # BLD AUTO: 3.1 K/UL
NEUTROPHILS # BLD AUTO: 3.5 K/UL
NEUTROPHILS # BLD AUTO: 3.8 K/UL
NEUTROPHILS # BLD AUTO: 4.4 K/UL
NEUTROPHILS # BLD AUTO: 4.6 K/UL
NEUTROPHILS NFR BLD: 77 %
NEUTROPHILS NFR BLD: 78.1 %
NEUTROPHILS NFR BLD: 79.7 %
NEUTROPHILS NFR BLD: 81.9 %
NEUTROPHILS NFR BLD: 83.4 %
NRBC BLD-RTO: 0 /100 WBC
PHOSPHATE SERPL-MCNC: 2.9 MG/DL
PLATELET # BLD AUTO: 111 K/UL
PLATELET # BLD AUTO: 114 K/UL
PLATELET # BLD AUTO: 91 K/UL
PLATELET # BLD AUTO: 96 K/UL
PLATELET # BLD AUTO: 96 K/UL
PMV BLD AUTO: 10.8 FL
PMV BLD AUTO: 10.8 FL
PMV BLD AUTO: 11.1 FL
PMV BLD AUTO: 11.2 FL
PMV BLD AUTO: 11.3 FL
POTASSIUM SERPL-SCNC: 3.2 MMOL/L
POTASSIUM SERPL-SCNC: 4 MMOL/L
PROT SERPL-MCNC: 4.4 G/DL
RBC # BLD AUTO: 2.79 M/UL
RBC # BLD AUTO: 2.87 M/UL
RBC # BLD AUTO: 2.89 M/UL
RBC # BLD AUTO: 3.03 M/UL
RBC # BLD AUTO: 3.18 M/UL
SODIUM SERPL-SCNC: 138 MMOL/L
SODIUM SERPL-SCNC: 141 MMOL/L
WBC # BLD AUTO: 3.86 K/UL
WBC # BLD AUTO: 4.21 K/UL
WBC # BLD AUTO: 4.99 K/UL
WBC # BLD AUTO: 5.55 K/UL
WBC # BLD AUTO: 5.68 K/UL

## 2019-02-05 PROCEDURE — 94761 N-INVAS EAR/PLS OXIMETRY MLT: CPT

## 2019-02-05 PROCEDURE — 83735 ASSAY OF MAGNESIUM: CPT

## 2019-02-05 PROCEDURE — 27100092 HC HIGH FLOW DELIVERY CANNULA

## 2019-02-05 PROCEDURE — 25000242 PHARM REV CODE 250 ALT 637 W/ HCPCS: Performed by: INTERNAL MEDICINE

## 2019-02-05 PROCEDURE — 63600175 PHARM REV CODE 636 W HCPCS: Performed by: STUDENT IN AN ORGANIZED HEALTH CARE EDUCATION/TRAINING PROGRAM

## 2019-02-05 PROCEDURE — 94640 AIRWAY INHALATION TREATMENT: CPT

## 2019-02-05 PROCEDURE — 63600175 PHARM REV CODE 636 W HCPCS

## 2019-02-05 PROCEDURE — 99233 SBSQ HOSP IP/OBS HIGH 50: CPT | Mod: ,,, | Performed by: INTERNAL MEDICINE

## 2019-02-05 PROCEDURE — C9113 INJ PANTOPRAZOLE SODIUM, VIA: HCPCS

## 2019-02-05 PROCEDURE — 80053 COMPREHEN METABOLIC PANEL: CPT

## 2019-02-05 PROCEDURE — 25000242 PHARM REV CODE 250 ALT 637 W/ HCPCS

## 2019-02-05 PROCEDURE — 80048 BASIC METABOLIC PNL TOTAL CA: CPT

## 2019-02-05 PROCEDURE — 25000003 PHARM REV CODE 250: Performed by: INTERNAL MEDICINE

## 2019-02-05 PROCEDURE — 97530 THERAPEUTIC ACTIVITIES: CPT

## 2019-02-05 PROCEDURE — 99900035 HC TECH TIME PER 15 MIN (STAT)

## 2019-02-05 PROCEDURE — 86850 RBC ANTIBODY SCREEN: CPT

## 2019-02-05 PROCEDURE — 86920 COMPATIBILITY TEST SPIN: CPT

## 2019-02-05 PROCEDURE — 27100171 HC OXYGEN HIGH FLOW UP TO 24 HOURS

## 2019-02-05 PROCEDURE — 25000003 PHARM REV CODE 250: Performed by: STUDENT IN AN ORGANIZED HEALTH CARE EDUCATION/TRAINING PROGRAM

## 2019-02-05 PROCEDURE — 20000000 HC ICU ROOM

## 2019-02-05 PROCEDURE — S0077 INJECTION, CLINDAMYCIN PHOSP: HCPCS | Performed by: STUDENT IN AN ORGANIZED HEALTH CARE EDUCATION/TRAINING PROGRAM

## 2019-02-05 PROCEDURE — 97161 PT EVAL LOW COMPLEX 20 MIN: CPT

## 2019-02-05 PROCEDURE — 25000003 PHARM REV CODE 250

## 2019-02-05 PROCEDURE — 85025 COMPLETE CBC W/AUTO DIFF WBC: CPT | Mod: 91

## 2019-02-05 PROCEDURE — 99233 PR SUBSEQUENT HOSPITAL CARE,LEVL III: ICD-10-PCS | Mod: ,,, | Performed by: INTERNAL MEDICINE

## 2019-02-05 PROCEDURE — 84100 ASSAY OF PHOSPHORUS: CPT

## 2019-02-05 RX ORDER — MAGNESIUM SULFATE HEPTAHYDRATE 40 MG/ML
2 INJECTION, SOLUTION INTRAVENOUS ONCE
Status: COMPLETED | OUTPATIENT
Start: 2019-02-05 | End: 2019-02-05

## 2019-02-05 RX ORDER — IPRATROPIUM BROMIDE AND ALBUTEROL SULFATE 2.5; .5 MG/3ML; MG/3ML
3 SOLUTION RESPIRATORY (INHALATION) EVERY 6 HOURS
Status: DISCONTINUED | OUTPATIENT
Start: 2019-02-05 | End: 2019-02-06

## 2019-02-05 RX ORDER — POTASSIUM CHLORIDE 20 MEQ/15ML
40 SOLUTION ORAL ONCE
Status: DISCONTINUED | OUTPATIENT
Start: 2019-02-05 | End: 2019-02-06

## 2019-02-05 RX ORDER — POTASSIUM CHLORIDE 7.45 MG/ML
10 INJECTION INTRAVENOUS
Status: COMPLETED | OUTPATIENT
Start: 2019-02-05 | End: 2019-02-05

## 2019-02-05 RX ORDER — PANTOPRAZOLE SODIUM 40 MG/10ML
40 INJECTION, POWDER, LYOPHILIZED, FOR SOLUTION INTRAVENOUS 2 TIMES DAILY
Status: DISCONTINUED | OUTPATIENT
Start: 2019-02-06 | End: 2019-02-08 | Stop reason: HOSPADM

## 2019-02-05 RX ORDER — IPRATROPIUM BROMIDE AND ALBUTEROL SULFATE 2.5; .5 MG/3ML; MG/3ML
3 SOLUTION RESPIRATORY (INHALATION) EVERY 6 HOURS
Status: DISCONTINUED | OUTPATIENT
Start: 2019-02-05 | End: 2019-02-05

## 2019-02-05 RX ADMIN — CLINDAMYCIN IN 5 PERCENT DEXTROSE 600 MG: 12 INJECTION, SOLUTION INTRAVENOUS at 04:02

## 2019-02-05 RX ADMIN — DEXTROSE 8 MG/HR: 50 INJECTION, SOLUTION INTRAVENOUS at 11:02

## 2019-02-05 RX ADMIN — CLINDAMYCIN IN 5 PERCENT DEXTROSE 600 MG: 12 INJECTION, SOLUTION INTRAVENOUS at 11:02

## 2019-02-05 RX ADMIN — DEXTROSE 8 MG/HR: 50 INJECTION, SOLUTION INTRAVENOUS at 05:02

## 2019-02-05 RX ADMIN — DEXTROSE 8 MG/HR: 50 INJECTION, SOLUTION INTRAVENOUS at 04:02

## 2019-02-05 RX ADMIN — POTASSIUM CHLORIDE 10 MEQ: 7.46 INJECTION, SOLUTION INTRAVENOUS at 01:02

## 2019-02-05 RX ADMIN — DEXTROSE 8 MG/HR: 50 INJECTION, SOLUTION INTRAVENOUS at 10:02

## 2019-02-05 RX ADMIN — IPRATROPIUM BROMIDE AND ALBUTEROL SULFATE 3 ML: .5; 3 SOLUTION RESPIRATORY (INHALATION) at 08:02

## 2019-02-05 RX ADMIN — SODIUM CHLORIDE 500 ML: 0.9 INJECTION, SOLUTION INTRAVENOUS at 01:02

## 2019-02-05 RX ADMIN — DEXTROSE 8 MG/HR: 50 INJECTION, SOLUTION INTRAVENOUS at 12:02

## 2019-02-05 RX ADMIN — MAGNESIUM SULFATE IN WATER 2 G: 40 INJECTION, SOLUTION INTRAVENOUS at 05:02

## 2019-02-05 RX ADMIN — POTASSIUM CHLORIDE 10 MEQ: 7.46 INJECTION, SOLUTION INTRAVENOUS at 12:02

## 2019-02-05 RX ADMIN — IPRATROPIUM BROMIDE AND ALBUTEROL SULFATE 3 ML: .5; 3 SOLUTION RESPIRATORY (INHALATION) at 02:02

## 2019-02-05 NOTE — PT/OT/SLP PROGRESS
Speech Language Pathology    Krish Blackbrun  MRN: [id number]    Patient not seen today secondary to MD hold (Comment), Other (Comment). Patient NPO per GI upon first attempt.  Findings reviewed with CMICU MD team. Upon second attempt, Pt with nurse and RT at bedside 2/2 pt with decrease in SpO2 following PT. SLP unable make third attempt later service day.   Will re-attempt 2/6/2019.     SCOTTIE Magdaleno., [facility]-SLP  Speech-Language Pathology  Pager: [phone]  2/5/2019

## 2019-02-05 NOTE — PT/OT/SLP EVAL
"Physical Therapy Evaluation    Patient Name:  Krish Blackburn   MRN:  [id number]    Recommendations:     Discharge Recommendations:  (Return to NH )   Discharge Equipment Recommendations: none   Barriers to discharge: None    Assessment:     Krish Blackburn is a 87 y.o. male admitted with a medical diagnosis of GI bleed.  He presents with the following impairments/functional limitations:  weakness, impaired endurance, impaired cognition, gait instability, impaired functional mobilty, impaired balance, impaired self care skills, impaired cardiopulmonary response to activity. Pt presents disoriented, following simple commands, requires significant assistance with OOB mobility. Pt is not at PLOF and would continue to benefit from acute skilled therapy intervention to address deficits and progress toward prior level of function.       Rehab Prognosis: Fair; patient would benefit from acute skilled PT services to address these deficits and reach maximum level of function.    Recent Surgery: Procedure(s) (LRB):  EGD (ESOPHAGOGASTRODUODENOSCOPY) (N/A) 3 Days Post-Op    Plan:     During this hospitalization, patient to be seen 3 x/week to address the identified rehab impairments via gait training, therapeutic activities, therapeutic exercises, neuromuscular re-education and progress toward the following goals:    · Plan of Care Expires:  03/05/19    Subjective     Chief Complaint: Pt presents slightly confused this session, states "I don't know what you're doing here"   Patient/Family Comments/goals: to get better and return home   Pain/Comfort:  · Pain Rating 1: 0/10  · Pain Rating Post-Intervention 1: 0/10    Patients cultural, spiritual, Restorationist conflicts given the current situation: no    Living Environment:  Pt with inconsistent report of information. Social information obtained from daughter, Lashae. Pt currently resides in a nursing home.   Prior to admission, patients level of function: typically able to perform ADLs " independently, able to transfer to wheelchair without assistance. Daughter states that recently, transferring to wheelchair has become more difficult, and pt occasionally requires help with transfers and ADLs.  Equipment used at home: wheelchair(NH equipment ).  DME owned (not currently used): none.  Upon discharge, patient will have assistance from NH staff.    Objective:     Communicated with RN prior to session.  Patient found L sidelying, with  blood pressure cuff, pulse ox (continuous), telemetry, peripheral IV, Condom Catheter, SCD  upon PT entry to room.    General Precautions: Standard, fall   Orthopedic Precautions:N/A   Braces: N/A     Exams:  · Cognitive Exam:  Patient very groggy and disoriented upon awakening. Pt unable to state name, time, or location. Pt communicating fluently, following simple commands. Pt hard of hearing   · RLE ROM: WFL  · RLE Strength: grossly 3/5   · LLE ROM: WFL  · LLE Strength: grossly 3/5     Functional Mobility:  · Bed Mobility:     · Supine to Sit: moderate assistance  · Sit to Supine: maximal assistance  · Transfers:     · Sit to Stand:  2x with maximal assistance with hand-held assist. Pt unable to reach full upright posture, immediately shouting to return to sitting. Denied pain and dizziness. Pt able to maintain standing for ~15 seconds while RN donned brief and changed linens   · Sitting balance: performed static sitting balance at EOB with CGA and flexed posture.       Therapeutic Activities and Exercises:   Pt educated on role of PT/POC.       AM-PAC 6 CLICK MOBILITY  Total Score:11     Patient left supine with all lines intact, call button in reach and RN  present.    GOALS:   Multidisciplinary Problems     Physical Therapy Goals        Problem: Physical Therapy Goal    Goal Priority Disciplines Outcome Goal Variances Interventions   Physical Therapy Goal     PT, PT/OT Ongoing (interventions implemented as appropriate)     Description:  Goals to be met by: 2/19/2019      Patient will increase functional independence with mobility by performin. Supine to sit with Set-up [place]  2. Sit to stand transfer with Contact Guard Assistance  3. Bed to chair transfer with Contact Guard Assistance using LRAD  4. Lower extremity exercise program x15 reps per handout, with assistance as needed                      History:     Past Medical History:   Diagnosis Date    Anemia     Arthritis     BPH (benign prostatic hyperplasia)     Cataract     COPD (chronic obstructive pulmonary disease)     Dysphagia     Emphysema lung     GERD (gastroesophageal reflux disease)     Heart failure     Hyperlipidemia     Hypertension     Hypokalemia     MI (myocardial infarction)     Osteoarthritis     Stroke        Past Surgical History:   Procedure Laterality Date    EGD (ESOPHAGOGASTRODUODENOSCOPY) N/A 2019    Performed by Leonel Lim MD at [facility] OR 2ND FLR    EGD (ESOPHAGOGASTRODUODENOSCOPY) N/A 2019    Performed by Leonel Lim MD at [facility] (2ND FLR)       Clinical Decision Making:     History  Co-morbidities and personal factors that may impact the plan of care Examination  Body Structures and Functions, activity limitations and participation restrictions that may impact the plan of care Clinical Presentation   Decision Making/ Complexity Score   Co-morbidities:   [] Time since onset of injury / illness / exacerbation  [] Status of current condition  []Patient's cognitive status and safety concerns    [] Multiple Medical Problems (see med hx)  Personal Factors:   [] Patient's age  [] Prior Level of function   [] Patient's home situation (environment and family support)  [] Patient's level of motivation  [] Expected progression of patient      HISTORY:(criteria)    [] 77891 - no personal factors/history    [] 06958 - has 1-2 personal factor/comorbidity     [] 44503 - has >3 personal factor/comorbidity     Body Regions:  [] Objective examination findings  []  Head     []  Neck  [] Trunk   [] Upper Extremity  [] Lower Extremity    Body Systems:  [] For communication ability, affect, cognition, language, and learning style: the assessment of the ability to make needs known, consciousness, orientation (person, place, and time), expected emotional /behavioral responses, and learning preferences (eg, learning barriers, education  needs)  [] For the neuromuscular system: a general assessment of gross coordinated movement (eg, balance, gait, locomotion, transfers, and transitions) and motor function  (motor control and motor learning)  [] For the musculoskeletal system: the assessment of gross symmetry, gross range of motion, gross strength, height, and weight  [] For the integumentary system: the assessment of pliability(texture), presence of scar formation, skin color, and skin integrity  [] For cardiovascular/pulmonary system: the assessment of heart rate, respiratory rate, blood pressure, and edema     Activity limitations:    [] Patient's cognitive status and saf ety concerns          [] Status of current condition      [] Weight bearing restriction  [] Cardiopulmunary Restriction    Participation Restrictions:   [] Goals and goal agreement with the patient     [] Rehab potential (prognosis) and probable outcome      Examination of Body System: (criteria)    [] 12086 - addressing 1-2 elements    [] 69410 - addressing a total of 3 or more elements     [] 67443 -  Addressing a total of 4 or more elements         Clinical Presentation: (criteria)  Choose one     On examination of body system using standardized tests and measures patient presents with 1-2 elements from any of the following: body structures and functions, activity limitations, and/or participation restrictions.  Leading to a clinical presentation that is considered stable and/or uncomplicated                              Clinical Decision Making  (Eval Complexity):  Low- 74166     Time Tracking:     PT Received  On: 02/05/19  PT Start Time: 1253     PT Stop Time: 1317  PT Total Time (min): 24 min     Billable Minutes: Evaluation 10 mins  and Therapeutic Activity 14 mins       Penelope Dee, PT  02/05/2019

## 2019-02-05 NOTE — MEDICAL/APP STUDENT
"Hospital Medicine ICU Acceptance Note    Date of Admit: 02/02/2019    Date of Transfer / Stepdown: 02/05/2019      ICU team stepping patient down:CCU    ICU team member giving verbal handoff: Emerald Zavaleta  (name +/- contact number)    Accepting  team: Eris Dodge M.D.      Brief History of Present Illness:    Mr. Krish Blackburn is a 87 y.o. with a PMH of HLD, COPD ( on home oxygen) who presented from NH for GI bleed. When asking the patient he kept saying " I do not know why I am here." In the ED patient found to have a hgb of 5.8, INR of 1.7, and BUN of 45. He was hypotensive with SBPs in the 70 . He denied any SOB, lightheadedness, palpitation or chest pain. When examined he had melena on the padding. He was oriented to self and place.     Hospital/ICU Course:  Patient admitted to MICU for close monitoring. EGD performed the day of presentation showed Grade D reflux esophagitis, duodenitis, and a large duodenal ulcer which was not amenable to intervention.      Overnight, patient had two melanotic bowel movements and hemoglobin dropped from 11.4 to 9.3. Per GI recs, IR was consulted and CTA performed; however, no active bleeding seen on the scan. Therefore, no embolization possible. Patient started on PPI drip and kept NPO per GI recs.      This morning, patient had an episodes of bleeding overnight which was not significant per nursing and CBC remains stable, BP remained stable with no need for pressors.  Will wean off oxygen as tolerated , per nursing he was sitting in bed this morning or RA with no desaturations , on rounds he was on 2 L doing well,stanislav step down today     Consultants and Procedures:      Consultants:  Gastroenterology: GI bleed  PT/OT: Evaluate and treat    Procedures:  Upper GI endoscopy  EGD    Transfer Information:    Diet:  NPO except for ice chips    ?    Physical Activity:  PT working with patient    ?    To Do / Pending Studies / Follow ups:  GI bleed     - At presentation, " patient with dark blood in his pad and JIMY. Do not know if patient is on NSAIDs , but he does take ASA no other anticoagulants  - S/p 3 u PRBC in ED  - EGD performed day of presentation. Revealed gastritis/duodenitis and [place] Class IIc duodenal ulcer with flat pigmented spot, too large for intervention  - After drop in hemoglobin following EGD, CTA performed, no active bleeding seen therefore embolization not possible  - Hemoglobin has remained stable since CTA  - Now on PPI gtt x 72 hours  - Advancing diet as tolerated  - CBC q8h  - GI following appreciate their recs      Aspiration pneumonia     - patient with episodes of coughing with food   - consolidation on left base concerning for aspiration pneumonia  - on clindamycin day 2       COPD (chronic obstructive pulmonary disease)     - Patient with history of COPD   - He states he wears oxygen but unable to quantify how many liters , somewhere around 4-6L but he has been doing well on 2L this morning and sometimes off oxygen   - Duo- nebs PRN       Cardiac/Vascular   Hyperlipidemia     - Continue home statin      Renal/   BPH (benign prostatic hyperplasia)     - Continue home finasteride    ?    Patient has been accepted by [facility] Team B, who will assume care of the patient upon arrival to the floor from the ICU. Please contact ICU team with any concerns prior to arrival. Please contact [facility] at [phone] or [phone] (please do NOT leave a voicemail) when patient arrives to the floor.    Eris MATHUR I personally scribed for Eris Dodge MD on 02/05/2019 at 4:01 PM. Electronically signed by néstor Lyles III on 02/05/2019 at 4:01 PM  The documentation recorded by the scribe accurately reflects service I personally performed and the decisions made by me.  Eris Dodge MD  Attending Staff Physician  [facility]  pager- [phone] [place] - [phone]

## 2019-02-05 NOTE — PROGRESS NOTES
MD Bailon notified of slow red/marroon trickle from pts rectum. Orders to pull CBC and type and screen, blood sent to lab. WCTM

## 2019-02-05 NOTE — PROGRESS NOTES
"[facility]  Critical Care Medicine  Progress Note    Patient Name: Krish Blackburn  MRN: [id number]  Admission Date: 2/2/2019  Hospital Length of Stay: 3 days  Code Status: Full Code  Attending Provider: Fredy Lopez*  Primary Care Provider: Shaun Zapata MD   Principal Problem: GI bleed    Subjective:     HPI:  Mr. Krish Blackburn is a 87 y.o. with a PMH of HLD, COPD ( on home oxygen) who presented from NH for GI bleed. When asking the patient he kept saying " I do not know why I am here." In the ED patient found to have a hgb of 5.8, INR of 1.7, and BUN of 45. He was hypotensive with SBPs in the 70 . He denied any SOB, lightheadedness, palpitation or chest pain. When examined he had melena on the padding. He was oriented to self and place.     In the ED he was given 3 units of blood and MICU was consulted for admission. GI was consulted in the ED     Can not determine if/when patient had a colonoscopy or endoscopy     Hospital/ICU Course:  Patient admitted to MICU for close monitoring. EGD performed the day of presentation showed Grade D reflux esophagitis, duodenitis, and a large duodenal ulcer which was not amenable to intervention.     Overnight, patient had two melanotic bowel movements and hemoglobin dropped from 11.4 to 9.3. Per GI recs, IR was consulted and CTA performed; however, no active bleeding seen on the scan. Therefore, no embolization possible. Patient started on PPI drip and kept NPO per GI recs.     This morning, patient had an episodes of bleeding overnight which was not significant per nursing and CBC remains stable, BP remained stable with no need for pressors.  Will wean off oxygen as tolerated , per nursing he was sitting in bed this morning or RA with no desaturations , on rounds he was on 2 L doing well,stanislav step down today     Interval History/Significant Events: NO issues overnight. Will need speech evaluation again     Review of Systems   Constitutional: " Positive for fatigue. Negative for fever.   HENT: Negative for sore throat and trouble swallowing.    Eyes: Negative for visual disturbance.   Respiratory: Negative for cough and shortness of breath.    Cardiovascular: Negative for chest pain.   Gastrointestinal: Positive for blood in stool (scant amounts of melanotic stool per nursing). Negative for constipation, diarrhea, nausea and vomiting.   Endocrine: Negative for polydipsia, polyphagia and polyuria.   Genitourinary: Negative for dysuria.   Musculoskeletal: Negative for arthralgias.   Skin: Negative for wound.   Neurological: Negative for headaches.   Psychiatric/Behavioral: Negative for decreased concentration and dysphoric mood.     Objective:     Vital Signs (Most Recent):  Temp: 97.6 °F (36.4 °C) (02/05/19 0301)  Pulse: 63 (02/05/19 0726)  Resp: 13 (02/05/19 0726)  BP: (!) 101/59 (02/05/19 0726)  SpO2: 95 % (02/05/19 0726) Vital Signs (24h Range):  Temp:  [97.6 °F (36.4 °C)-98.1 °F (36.7 °C)] 97.6 °F (36.4 °C)  Pulse:  [61-78] 63  Resp:  [12-46] 13  SpO2:  [83 %-99 %] 95 %  BP: ()/(50-78) 101/59   Weight: 49.9 kg (110 lb 0.2 oz)  Body mass index is 17.23 kg/m².      Intake/Output Summary (Last 24 hours) at 2/5/2019 0821  Last data filed at 2/5/2019 0601  Gross per 24 hour   Intake 970.33 ml   Output 190 ml   Net 780.33 ml       Physical Exam   Constitutional: He appears well-developed. No distress.   HENT:   Head: Normocephalic and atraumatic.   Eyes: EOM are normal. Pupils are equal, round, and reactive to light.   Neck: Normal range of motion. Neck supple.   Cardiovascular: Normal rate and regular rhythm.   Pulmonary/Chest: Effort normal and breath sounds normal.   Abdominal: Soft. Bowel sounds are normal. He exhibits no distension.   JIMY with tarry black blood.    Musculoskeletal: Normal range of motion. He exhibits edema (bilateral 1+ pitting edema ).   Neurological: He is alert.   Skin: Skin is warm and dry. He is not diaphoretic. There is  pallor.   Psychiatric: He has a normal mood and affect. His behavior is normal. Judgment and thought content normal.       Vents:  Oxygen Concentration (%): 50 (02/05/19 0726)  Lines/Drains/Airways     Drain            Male External Urinary Catheter 02/03/19 1200 Small 1 day          Peripheral Intravenous Line                 Peripheral IV - Single Lumen 02/03/19 2300 Right Upper Arm 1 day         Midline Catheter Insertion/Assessment  - Single Lumen 02/04/19 1028 Left brachial vein 20g x 8cm less than 1 day              Significant Labs:    CBC/Anemia Profile:  Recent Labs   Lab 02/04/19  1536 02/04/19  2332 02/05/19  0419   WBC 9.67 6.79 5.55   HGB 9.6* 8.7* 8.6*   HCT 30.1* 27.6* 27.6*   * 98* 91*   MCV 93 94 96   RDW 18.0* 18.3* 18.0*        Chemistries:  Recent Labs   Lab 02/04/19  0300 02/05/19  0419    141   K 3.5 3.2*   * 110   CO2 23 25   BUN 22 18   CREATININE 0.6 0.6   CALCIUM 8.1* 8.1*   ALBUMIN 1.9* 2.0*   PROT 4.3* 4.4*   BILITOT 0.9 0.9   ALKPHOS 62 64   ALT 10 12   AST 18 18   MG 1.6 2.0   PHOS 2.4* 2.9           Significant Imaging:  I have reviewed all pertinent imaging results/findings within the past 24 hours.  I have reviewed and interpreted all pertinent imaging results/findings within the past 24 hours.      ABG  Recent Labs   Lab 02/03/19  1736   PH 7.400   PO2 37*   PCO2 37.6   HCO3 23.3*   BE -1     Assessment/Plan:     Pulmonary   Aspiration pneumonia    - patient with episodes of coughing with food   - consolidation on left base concerning for aspiration pneumonia  - on clindamycin day 2      COPD (chronic obstructive pulmonary disease)    - Patient with history of COPD   - He states he wears oxygen but unable to quantify how many liters , somewhere around 4-6L but he has been doing well on 2L this morning and sometimes off oxygen   - Duo- nebs PRN      Cardiac/Vascular   Hyperlipidemia    - Continue home statin     Renal/   BPH (benign prostatic hyperplasia)    -  Continue home finasteride      GI   * GI bleed    - At presentation, patient with dark blood in his pad and JIMY. Do not know if patient is on NSAIDs , but he does take ASA no other anticoagulants  - S/p 3 u PRBC in ED  - EGD performed day of presentation. Revealed gastritis/duodenitis and [place] Class IIc duodenal ulcer with flat pigmented spot, too large for intervention  - After drop in hemoglobin following EGD, CTA performed, no active bleeding seen therefore embolization not possible  - Hemoglobin has remained stable since CTA  - Now on PPI gtt x 72 hours  - Advancing diet as tolerated  - CBC q8h  - GI following appreciate their recs         Critical Care Daily Checklist:    A: Awake: RASS Goal/Actual Goal: RASS Goal: 0-->alert and calm  Actual: [place] Agitation Sedation Scale (RASS): Alert and calm   B: Spontaneous Breathing Trial Performed?     C: SAT & SBT Coordinated?  -                      D: Delirium: CAM-ICU Overall CAM-ICU: Negative   E: Early Mobility Performed? Yes   F: Feeding Goal:    Status:     Current Diet Order   Procedures    Diet NPO Except for: Ice Chips     Order Specific Question:   Except for     Answer:   Ice Chips      AS: Analgesia/Sedation -   T: Thromboembolic Prophylaxis -   H: HOB > 300 Yes   U: Stress Ulcer Prophylaxis (if needed) PPI   G: Glucose Control -   B: Bowel Function Stool Occurrence: 0   I: Indwelling Catheter (Lines & [place]) Necessity -   D: De-escalation of Antimicrobials/Pharmacotherapies -    Plan for the day/ETD Step down     Code Status:  Family/Goals of Care: Full Code         Critical secondary to Patient has a condition that poses threat to life and bodily function: Severe Respiratory Distress      Critical care was time spent personally by me on the following activities: development of treatment plan with patient or surrogate and bedside caregivers, discussions with consultants, evaluation of patient's response to treatment, examination of patient, ordering  and performing treatments and interventions, ordering and review of laboratory studies, ordering and review of radiographic studies, pulse oximetry, re-evaluation of patient's condition. This critical care time did not overlap with that of any other provider or involve time for any procedures.     Kailey Call MD  Critical Care Medicine  [facility]

## 2019-02-05 NOTE — SUBJECTIVE & OBJECTIVE
Interval History/Significant Events: NO issues overnight. Will need speech evaluation again     Review of Systems   Constitutional: Positive for fatigue. Negative for fever.   HENT: Negative for sore throat and trouble swallowing.    Eyes: Negative for visual disturbance.   Respiratory: Negative for cough and shortness of breath.    Cardiovascular: Negative for chest pain.   Gastrointestinal: Positive for blood in stool (scant amounts of melanotic stool per nursing). Negative for constipation, diarrhea, nausea and vomiting.   Endocrine: Negative for polydipsia, polyphagia and polyuria.   Genitourinary: Negative for dysuria.   Musculoskeletal: Negative for arthralgias.   Skin: Negative for wound.   Neurological: Negative for headaches.   Psychiatric/Behavioral: Negative for decreased concentration and dysphoric mood.     Objective:     Vital Signs (Most Recent):  Temp: 97.6 °F (36.4 °C) (02/05/19 0301)  Pulse: 63 (02/05/19 0726)  Resp: 13 (02/05/19 0726)  BP: (!) 101/59 (02/05/19 0726)  SpO2: 95 % (02/05/19 0726) Vital Signs (24h Range):  Temp:  [97.6 °F (36.4 °C)-98.1 °F (36.7 °C)] 97.6 °F (36.4 °C)  Pulse:  [61-78] 63  Resp:  [12-46] 13  SpO2:  [83 %-99 %] 95 %  BP: ()/(50-78) 101/59   Weight: 49.9 kg (110 lb 0.2 oz)  Body mass index is 17.23 kg/m².      Intake/Output Summary (Last 24 hours) at 2/5/2019 0821  Last data filed at 2/5/2019 0601  Gross per 24 hour   Intake 970.33 ml   Output 190 ml   Net 780.33 ml       Physical Exam   Constitutional: He appears well-developed. No distress.   HENT:   Head: Normocephalic and atraumatic.   Eyes: EOM are normal. Pupils are equal, round, and reactive to light.   Neck: Normal range of motion. Neck supple.   Cardiovascular: Normal rate and regular rhythm.   Pulmonary/Chest: Effort normal and breath sounds normal.   Abdominal: Soft. Bowel sounds are normal. He exhibits no distension.   JIMY with tarry black blood.    Musculoskeletal: Normal range of motion. He exhibits  edema (bilateral 1+ pitting edema ).   Neurological: He is alert.   Skin: Skin is warm and dry. He is not diaphoretic. There is pallor.   Psychiatric: He has a normal mood and affect. His behavior is normal. Judgment and thought content normal.       Vents:  Oxygen Concentration (%): 50 (02/05/19 0726)  Lines/Drains/Airways     Drain            Male External Urinary Catheter 02/03/19 1200 Small 1 day          Peripheral Intravenous Line                 Peripheral IV - Single Lumen 02/03/19 2300 Right Upper Arm 1 day         Midline Catheter Insertion/Assessment  - Single Lumen 02/04/19 1028 Left brachial vein 20g x 8cm less than 1 day              Significant Labs:    CBC/Anemia Profile:  Recent Labs   Lab 02/04/19  1536 02/04/19  2332 02/05/19  0419   WBC 9.67 6.79 5.55   HGB 9.6* 8.7* 8.6*   HCT 30.1* 27.6* 27.6*   * 98* 91*   MCV 93 94 96   RDW 18.0* 18.3* 18.0*        Chemistries:  Recent Labs   Lab 02/04/19  0300 02/05/19  0419    141   K 3.5 3.2*   * 110   CO2 23 25   BUN 22 18   CREATININE 0.6 0.6   CALCIUM 8.1* 8.1*   ALBUMIN 1.9* 2.0*   PROT 4.3* 4.4*   BILITOT 0.9 0.9   ALKPHOS 62 64   ALT 10 12   AST 18 18   MG 1.6 2.0   PHOS 2.4* 2.9           Significant Imaging:  I have reviewed all pertinent imaging results/findings within the past 24 hours.  I have reviewed and interpreted all pertinent imaging results/findings within the past 24 hours.

## 2019-02-05 NOTE — RESIDENT HANDOFF
"Handoff   Called in at 12:05 to [facility] medicine    Handoff     Primary Team: [facility] [place] [facility] Room Number: 6071/6071 A     Patient Name: Krish Blackburn MRN: [id number]     Date of Birth: [id number] Allergies: Patient has no known allergies.     Age: 87 y.o. Admit Date: 2/2/2019     Sex: male  BMI: Body mass index is 17.23 kg/m².     Code Status: Full Code        Illness Level (current clinical status): Watcher - No    Reason for Admission: GI bleed    Brief HPI : Mr. Krish Blackburn is a 87 y.o. with a PMH of HLD, COPD ( on home oxygen) who presented from NH for GI bleed. When asking the patient he kept saying " I do not know why I am here." In the ED patient found to have a hgb of 5.8, INR of 1.7, and BUN of 45. He was hypotensive with SBPs in the 70 . He denied any SOB, lightheadedness, palpitation or chest pain. When examined he had melena on the padding. He was oriented to self and place.      In the ED he was given 3 units of blood and MICU was consulted for admission. GI was consulted in the ED      Can not determine if/when patient had a colonoscopy or endoscopy         Procedure Date: 2/2 EGD with duodenal ulcer and gastritis     Hospital Course :Patient admitted to MICU for close monitoring. EGD performed the day of presentation showed Grade D reflux esophagitis, duodenitis, and a large duodenal ulcer which was not amenable to intervention.      Overnight, patient had two melanotic bowel movements and hemoglobin dropped from 11.4 to 9.3. Per GI recs, IR was consulted and CTA performed; however, no active bleeding seen on the scan. Therefore, no embolization possible. Patient started on PPI drip and kept NPO per GI recs.      This morning, patient had an episodes of bleeding overnight which was not significant per nursing and CBC remains stable, BP remained stable with no need for pressors.  Will wean off oxygen as tolerated , per nursing he was sitting in bed this morning or RA with no desaturations , on " rounds he was on 2 L doing well,stanislav step down today     Tasks:     Follow up GI  Follow up PT/OT  Follow up speech   Trend CBC  Call IR/GI  if re-bleeds     Contingency Plan : Back to NH     Estimated Discharge Date: TBA    Discharge Disposition: Nursing Facility    Mentored By: Dr. Lopez

## 2019-02-05 NOTE — PLAN OF CARE
Problem: Physical Therapy Goal  Goal: Physical Therapy Goal  Goals to be met by: 2019     Patient will increase functional independence with mobility by performin. Supine to sit with Set-up Brea  2. Sit to stand transfer with Contact Guard Assistance  3. Bed to chair transfer with Contact Guard Assistance using LRAD  4. Lower extremity exercise program x15 reps per handout, with assistance as needed    Outcome: Ongoing (interventions implemented as appropriate)  Pt evaluated and appropriate goals established.

## 2019-02-05 NOTE — ASSESSMENT & PLAN NOTE
- patient with episodes of coughing with food   - consolidation on left base concerning for aspiration pneumonia  - on clindamycin day 2

## 2019-02-05 NOTE — ASSESSMENT & PLAN NOTE
- Patient with history of COPD   - He states he wears oxygen but unable to quantify how many liters , somewhere around 4-6L but he has been doing well on 2L this morning and sometimes off oxygen   - Duo- xavier PRN

## 2019-02-05 NOTE — ASSESSMENT & PLAN NOTE
- At presentation, patient with dark blood in his pad and JIMY. Do not know if patient is on NSAIDs , but he does take ASA no other anticoagulants  - S/p 3 u PRBC in ED  - EGD performed day of presentation. Revealed gastritis/duodenitis and [place] Class IIc duodenal ulcer with flat pigmented spot, too large for intervention  - After drop in hemoglobin following EGD, CTA performed, no active bleeding seen therefore embolization not possible  - Hemoglobin has remained stable since CTA  - Now on PPI gtt x 72 hours  - Advancing diet as tolerated  - CBC q8h  - GI following appreciate their recs

## 2019-02-05 NOTE — PLAN OF CARE
Problem: Adult Inpatient Plan of Care  Goal: Plan of Care Review  Outcome: Ongoing (interventions implemented as appropriate)    No acute events throughout day. See vital signs and assessments in flowsheets. See below for updates on today's progress.     Pulmonary: Venti mask 15L 50% sats maintained >88%. Pt has wet, productive cough    Cardiovascular: HR 70s, BP /50s. SBP dropped below 89 and MAP dropped below 65, 500ml bolus NS given with improvements in BP. Pulses 2+/1+    Neurological: oriented to person and place. PERRL 2-3mm. Afebrile, follows commands and moves all extremities.     Gastrointestinal: NPO, BS active but faint. Pt had steady trickling of dark red blood from rectum throughout shift    Genitourinary: pad changed x3, condom cath was in place but pt pulled off throughout night     Integumentary/Other: bruises to arms, skin tear to right arm x2. Foam dressings on skin tears, heels and sacrum    Infusions: Protonix @ 8mg/h    Mag 1.6 replaced with 2G IVPB, MD Rodriguez notified of K 3.2, no orders to replace at this time    Patient progressing towards goals as tolerated, plan of care communicated and reviewed with Krish Blackburn and family. All concerns addressed. Will continue to monitor.

## 2019-02-06 PROBLEM — J96.21 ACUTE ON CHRONIC RESPIRATORY FAILURE WITH HYPOXIA: Status: ACTIVE | Noted: 2019-02-06

## 2019-02-06 PROBLEM — G93.40 ENCEPHALOPATHY: Status: ACTIVE | Noted: 2019-02-06

## 2019-02-06 LAB
ALBUMIN SERPL BCP-MCNC: 2.1 G/DL
ALP SERPL-CCNC: 67 U/L
ALT SERPL W/O P-5'-P-CCNC: 13 U/L
ANION GAP SERPL CALC-SCNC: 10 MMOL/L
ANISOCYTOSIS BLD QL SMEAR: SLIGHT
AST SERPL-CCNC: 23 U/L
BASOPHILS # BLD AUTO: 0.01 K/UL
BASOPHILS # BLD AUTO: 0.01 K/UL
BASOPHILS # BLD AUTO: 0.02 K/UL
BASOPHILS # BLD AUTO: 0.02 K/UL
BASOPHILS NFR BLD: 0.2 %
BASOPHILS NFR BLD: 0.2 %
BASOPHILS NFR BLD: 0.3 %
BASOPHILS NFR BLD: 0.4 %
BILIRUB SERPL-MCNC: 0.9 MG/DL
BUN SERPL-MCNC: 16 MG/DL
CALCIUM SERPL-MCNC: 8.1 MG/DL
CHLORIDE SERPL-SCNC: 108 MMOL/L
CO2 SERPL-SCNC: 19 MMOL/L
CREAT SERPL-MCNC: 0.6 MG/DL
DIFFERENTIAL METHOD: ABNORMAL
EOSINOPHIL # BLD AUTO: 0 K/UL
EOSINOPHIL NFR BLD: 0.3 %
EOSINOPHIL NFR BLD: 0.5 %
EOSINOPHIL NFR BLD: 0.6 %
EOSINOPHIL NFR BLD: 0.7 %
ERYTHROCYTE [DISTWIDTH] IN BLOOD BY AUTOMATED COUNT: 18.5 %
ERYTHROCYTE [DISTWIDTH] IN BLOOD BY AUTOMATED COUNT: 18.9 %
ERYTHROCYTE [DISTWIDTH] IN BLOOD BY AUTOMATED COUNT: 19.3 %
ERYTHROCYTE [DISTWIDTH] IN BLOOD BY AUTOMATED COUNT: 19.6 %
EST. GFR  (AFRICAN AMERICAN): >60 ML/MIN/1.73 M^2
EST. GFR  (NON AFRICAN AMERICAN): >60 ML/MIN/1.73 M^2
GIANT PLATELETS BLD QL SMEAR: PRESENT
GLUCOSE SERPL-MCNC: 66 MG/DL
HCT VFR BLD AUTO: 26.7 %
HCT VFR BLD AUTO: 30.6 %
HCT VFR BLD AUTO: 31.2 %
HCT VFR BLD AUTO: 33.3 %
HGB BLD-MCNC: 11.2 G/DL
HGB BLD-MCNC: 8.5 G/DL
HGB BLD-MCNC: 9.4 G/DL
HGB BLD-MCNC: 9.8 G/DL
HYPOCHROMIA BLD QL SMEAR: ABNORMAL
IMM GRANULOCYTES # BLD AUTO: 0.01 K/UL
IMM GRANULOCYTES # BLD AUTO: 0.01 K/UL
IMM GRANULOCYTES # BLD AUTO: 0.04 K/UL
IMM GRANULOCYTES # BLD AUTO: 0.06 K/UL
IMM GRANULOCYTES NFR BLD AUTO: 0.2 %
IMM GRANULOCYTES NFR BLD AUTO: 0.2 %
IMM GRANULOCYTES NFR BLD AUTO: 0.7 %
IMM GRANULOCYTES NFR BLD AUTO: 1 %
LYMPHOCYTES # BLD AUTO: 0.4 K/UL
LYMPHOCYTES # BLD AUTO: 0.5 K/UL
LYMPHOCYTES # BLD AUTO: 0.6 K/UL
LYMPHOCYTES # BLD AUTO: 0.9 K/UL
LYMPHOCYTES NFR BLD: 12.1 %
LYMPHOCYTES NFR BLD: 15.2 %
LYMPHOCYTES NFR BLD: 8.6 %
LYMPHOCYTES NFR BLD: 8.9 %
MAGNESIUM SERPL-MCNC: 2.6 MG/DL
MCH RBC QN AUTO: 28.6 PG
MCH RBC QN AUTO: 29.6 PG
MCH RBC QN AUTO: 29.8 PG
MCH RBC QN AUTO: 30.2 PG
MCHC RBC AUTO-ENTMCNC: 30.7 G/DL
MCHC RBC AUTO-ENTMCNC: 31.4 G/DL
MCHC RBC AUTO-ENTMCNC: 31.8 G/DL
MCHC RBC AUTO-ENTMCNC: 33.6 G/DL
MCV RBC AUTO: 90 FL
MCV RBC AUTO: 91 FL
MCV RBC AUTO: 94 FL
MCV RBC AUTO: 96 FL
MONOCYTES # BLD AUTO: 0.8 K/UL
MONOCYTES # BLD AUTO: 0.9 K/UL
MONOCYTES # BLD AUTO: 1.1 K/UL
MONOCYTES # BLD AUTO: 1.3 K/UL
MONOCYTES NFR BLD: 14.9 %
MONOCYTES NFR BLD: 17.7 %
MONOCYTES NFR BLD: 21.1 %
MONOCYTES NFR BLD: 21.6 %
NEUTROPHILS # BLD AUTO: 3.2 K/UL
NEUTROPHILS # BLD AUTO: 3.3 K/UL
NEUTROPHILS # BLD AUTO: 3.7 K/UL
NEUTROPHILS # BLD AUTO: 4.5 K/UL
NEUTROPHILS NFR BLD: 62.2 %
NEUTROPHILS NFR BLD: 65.1 %
NEUTROPHILS NFR BLD: 72.3 %
NEUTROPHILS NFR BLD: 75 %
NRBC BLD-RTO: 0 /100 WBC
NRBC BLD-RTO: 1 /100 WBC
OVALOCYTES BLD QL SMEAR: ABNORMAL
PHOSPHATE SERPL-MCNC: 2.5 MG/DL
PLATELET # BLD AUTO: 111 K/UL
PLATELET # BLD AUTO: 115 K/UL
PLATELET # BLD AUTO: 143 K/UL
PLATELET # BLD AUTO: 84 K/UL
PLATELET BLD QL SMEAR: ABNORMAL
PMV BLD AUTO: 10.7 FL
PMV BLD AUTO: 10.7 FL
PMV BLD AUTO: 10.9 FL
PMV BLD AUTO: 11.8 FL
POIKILOCYTOSIS BLD QL SMEAR: SLIGHT
POLYCHROMASIA BLD QL SMEAR: ABNORMAL
POTASSIUM SERPL-SCNC: 3.9 MMOL/L
PROT SERPL-MCNC: 4.8 G/DL
RBC # BLD AUTO: 2.85 M/UL
RBC # BLD AUTO: 3.18 M/UL
RBC # BLD AUTO: 3.43 M/UL
RBC # BLD AUTO: 3.71 M/UL
SODIUM SERPL-SCNC: 137 MMOL/L
WBC # BLD AUTO: 4.36 K/UL
WBC # BLD AUTO: 5.05 K/UL
WBC # BLD AUTO: 5.92 K/UL
WBC # BLD AUTO: 6.04 K/UL

## 2019-02-06 PROCEDURE — 97530 THERAPEUTIC ACTIVITIES: CPT

## 2019-02-06 PROCEDURE — 63600175 PHARM REV CODE 636 W HCPCS

## 2019-02-06 PROCEDURE — 63600175 PHARM REV CODE 636 W HCPCS: Performed by: INTERNAL MEDICINE

## 2019-02-06 PROCEDURE — 97535 SELF CARE MNGMENT TRAINING: CPT

## 2019-02-06 PROCEDURE — S0077 INJECTION, CLINDAMYCIN PHOSP: HCPCS | Performed by: STUDENT IN AN ORGANIZED HEALTH CARE EDUCATION/TRAINING PROGRAM

## 2019-02-06 PROCEDURE — 94761 N-INVAS EAR/PLS OXIMETRY MLT: CPT

## 2019-02-06 PROCEDURE — 92610 EVALUATE SWALLOWING FUNCTION: CPT

## 2019-02-06 PROCEDURE — C9113 INJ PANTOPRAZOLE SODIUM, VIA: HCPCS | Performed by: INTERNAL MEDICINE

## 2019-02-06 PROCEDURE — 25000242 PHARM REV CODE 250 ALT 637 W/ HCPCS: Performed by: INTERNAL MEDICINE

## 2019-02-06 PROCEDURE — 25000003 PHARM REV CODE 250

## 2019-02-06 PROCEDURE — 99233 PR SUBSEQUENT HOSPITAL CARE,LEVL III: ICD-10-PCS | Mod: ,,, | Performed by: HOSPITALIST

## 2019-02-06 PROCEDURE — 84100 ASSAY OF PHOSPHORUS: CPT

## 2019-02-06 PROCEDURE — 11000001 HC ACUTE MED/SURG PRIVATE ROOM

## 2019-02-06 PROCEDURE — 80053 COMPREHEN METABOLIC PANEL: CPT

## 2019-02-06 PROCEDURE — 94640 AIRWAY INHALATION TREATMENT: CPT

## 2019-02-06 PROCEDURE — 25000242 PHARM REV CODE 250 ALT 637 W/ HCPCS: Performed by: STUDENT IN AN ORGANIZED HEALTH CARE EDUCATION/TRAINING PROGRAM

## 2019-02-06 PROCEDURE — 85025 COMPLETE CBC W/AUTO DIFF WBC: CPT | Mod: 91

## 2019-02-06 PROCEDURE — 27100171 HC OXYGEN HIGH FLOW UP TO 24 HOURS

## 2019-02-06 PROCEDURE — 27000221 HC OXYGEN, UP TO 24 HOURS

## 2019-02-06 PROCEDURE — 94668 MNPJ CHEST WALL SBSQ: CPT

## 2019-02-06 PROCEDURE — 36415 COLL VENOUS BLD VENIPUNCTURE: CPT

## 2019-02-06 PROCEDURE — 99233 SBSQ HOSP IP/OBS HIGH 50: CPT | Mod: ,,, | Performed by: HOSPITALIST

## 2019-02-06 PROCEDURE — 25000003 PHARM REV CODE 250: Performed by: STUDENT IN AN ORGANIZED HEALTH CARE EDUCATION/TRAINING PROGRAM

## 2019-02-06 PROCEDURE — 99900035 HC TECH TIME PER 15 MIN (STAT)

## 2019-02-06 PROCEDURE — 83735 ASSAY OF MAGNESIUM: CPT

## 2019-02-06 PROCEDURE — C9113 INJ PANTOPRAZOLE SODIUM, VIA: HCPCS

## 2019-02-06 RX ORDER — IPRATROPIUM BROMIDE AND ALBUTEROL SULFATE 2.5; .5 MG/3ML; MG/3ML
3 SOLUTION RESPIRATORY (INHALATION)
Status: DISCONTINUED | OUTPATIENT
Start: 2019-02-06 | End: 2019-02-08 | Stop reason: HOSPADM

## 2019-02-06 RX ORDER — SODIUM CHLORIDE 9 MG/ML
INJECTION, SOLUTION INTRAVENOUS CONTINUOUS
Status: ACTIVE | OUTPATIENT
Start: 2019-02-06 | End: 2019-02-07

## 2019-02-06 RX ADMIN — IPRATROPIUM BROMIDE AND ALBUTEROL SULFATE 3 ML: .5; 3 SOLUTION RESPIRATORY (INHALATION) at 01:02

## 2019-02-06 RX ADMIN — SODIUM CHLORIDE: 0.9 INJECTION, SOLUTION INTRAVENOUS at 03:02

## 2019-02-06 RX ADMIN — CLINDAMYCIN IN 5 PERCENT DEXTROSE 600 MG: 12 INJECTION, SOLUTION INTRAVENOUS at 11:02

## 2019-02-06 RX ADMIN — PANTOPRAZOLE SODIUM 40 MG: 40 INJECTION, POWDER, FOR SOLUTION INTRAVENOUS at 09:02

## 2019-02-06 RX ADMIN — CLINDAMYCIN IN 5 PERCENT DEXTROSE 600 MG: 12 INJECTION, SOLUTION INTRAVENOUS at 03:02

## 2019-02-06 RX ADMIN — DEXTROSE 8 MG/HR: 50 INJECTION, SOLUTION INTRAVENOUS at 02:02

## 2019-02-06 RX ADMIN — IPRATROPIUM BROMIDE AND ALBUTEROL SULFATE 3 ML: .5; 3 SOLUTION RESPIRATORY (INHALATION) at 07:02

## 2019-02-06 RX ADMIN — PANTOPRAZOLE SODIUM 40 MG: 40 INJECTION, POWDER, FOR SOLUTION INTRAVENOUS at 11:02

## 2019-02-06 NOTE — ASSESSMENT & PLAN NOTE
Unclear baseline mentation but patient is a nursing home resident.   Oriented but thinking is not clear, gets agitated easily, poor understanding of situation  Dementia workup with TSH, vitamin studies, RPR  Will discuss with family

## 2019-02-06 NOTE — ASSESSMENT & PLAN NOTE
- At presentation, patient with dark blood in his pad and JIMY. Do not know if patient is on NSAIDs , but he does take ASA no other anticoagulants  - S/p 3 u PRBC in ED  - EGD performed day of presentation. Revealed gastritis/duodenitis and [place] Class IIc duodenal ulcer with flat pigmented spot, too large for intervention  - After drop in hemoglobin following EGD, CTA performed, no active bleeding seen therefore embolization not possible  - Hemoglobin has remained stable since CTA  - S/p PPI gtt x 72 hours, now on BID PPI IV  - Advancing diet as tolerated  - CBC BID  - GI following appreciate their recs   If rebleeds will consider repeating CTA

## 2019-02-06 NOTE — PT/OT/SLP PROGRESS
Occupational Therapy   Treatment & Discharge     Name: Krish Blackburn  MRN: [id number]  Admitting Diagnosis:  GI bleed  4 Days Post-Op    Recommendations:     Discharge Recommendations: (return to NH)  Discharge Equipment Recommendations:  none  Barriers to discharge:  None    Assessment:     Krish Blackburn is a 87 y.o. male with a medical diagnosis of GI bleed.  He presents with no further acute OT needs at this time as pt is at his functional baseline.     Plan:     · Patient d/c'ed from OT 2/6/2019  · Plan of Care Expires: 02/06/19  · Plan of Care Reviewed with: patient    Subjective     Pain/Comfort:  · Pain Rating 1: 0/10  · Pain Rating Post-Intervention 1: 0/10    Objective:     Communicated with: RN prior to session.  Patient found supine with peripheral IV, telemetry upon OT entry to room.    General Precautions: Standard, fall   Orthopedic Precautions:N/A   Braces: N/A     Occupational Performance:    Bed Mobility:   · Supine>Sit: standby assistance    · HOB flat  · Increased time for completion   · Scooting to EOB: supervision      Transfers:   · Sit<>Stand Transfer: contact guard assistance from/onto EOB with HHA  · x2 trials     · SPT Bed <> Chair: contact guard assistance without any AD  · With chair positioned anteriorly to simulate wheelchair t/f's at NH    Functional Mobility: Pt engaging in functional mobility to simulate household distances approx 10 side steps x2 trials with minimum assistance  and utilizing HHA in order to maximize functional activity tolerance and standing balance required for engagement in occupations of choice.      Activities of Daily Living:  · Grooming: setup assistance   · Seated to wash face and neck while EOB with supervision for sitting balance       Therapeutic Intervention & Education:  · Cognition: Pt alert and oriented x4, however is [place]. Pt with fair insight into condition and with good motivation to engage in therapy   · Communicated OT POC  · Updated communication  board  · Educated on importance of OOB mobility with assist, sitting up in chair, and maximizing independence with ADLs  · Telesitter present during session  · Pt seated at EOB x3 minutes for grooming>stand and side steps towards R>seated break>side steps towards the L and R>seated break and return to supine>supine to sit>SPT to chair     AMPAC 6 Click ADL: 19    Patient left up in chair with all lines intact, call button in reach, chair alarm on and RN notifiedEducation:      GOALS:   Multidisciplinary Problems     Occupational Therapy Goals     Not on file          Multidisciplinary Problems (Resolved)        Problem: Occupational Therapy Goal    Goal Priority Disciplines Outcome Interventions   Occupational Therapy Goal   (Resolved)     OT, PT/OT Outcome(s) achieved    Description:  Goals to be met by: 2/13     Patient will increase functional independence with ADLs by performing:    UE Dressing with Moderate Assistance.  LE Dressing with Maximum Assistance.  Grooming while seated with Stand-by Assistance.  Toileting from bedside commode with Maximum Assistance for hygiene and clothing management.   Sitting at edge of bed x15 minutes with Stand-by Assistance.  Rolling to Bilateral with Supervision.   Supine to sit with Minimal Assistance.  Stand pivot transfers with Minimal Assistance.                      Time Tracking:     OT Date of Treatment: 02/06/19  OT Start Time: 0933  OT Stop Time: 0956  OT Total Time (min): 23 min    Billable Minutes:Self Care/Home Management 10  Therapeutic Activity 13    Viola Reese OT  2/6/2019

## 2019-02-06 NOTE — PT/OT/SLP EVAL
"Speech Language Pathology Evaluation  Bedside Swallow    Patient Name:  Krish Blackburn   MRN:  [id number]  Admitting Diagnosis: GI bleed    Recommendations:                 General Recommendations: When respiratory status deemed stable, SLP recs pt participate in Modified barium swallow study to objectively assess swallow to determine safety of PO intake before initiating diet   Diet recommendations:  NPO, NPO   Aspiration Precautions: Frequent oral care, necessary Meds may be crushed in puree and Strict aspiration precautions   All recs d/w team  General Precautions: Standard, aspiration, fall, NPO, respiratory  Communication strategies:  none    History:     Past Medical History:   Diagnosis Date    Anemia     Arthritis     BPH (benign prostatic hyperplasia)     Cataract     COPD (chronic obstructive pulmonary disease)     Dysphagia     Emphysema lung     GERD (gastroesophageal reflux disease)     Heart failure     Hyperlipidemia     Hypertension     Hypokalemia     MI (myocardial infarction)     Osteoarthritis     Stroke        Past Surgical History:   Procedure Laterality Date    EGD (ESOPHAGOGASTRODUODENOSCOPY) N/A 2/2/2019    Performed by Leonel Lim MD at [facility] OR 2ND FLR    EGD (ESOPHAGOGASTRODUODENOSCOPY) N/A 2/2/2019    Performed by Leonel Lim MD at [facility] ENDO (2ND FLR)       Chest X-Rays: 2/4 Increasing opacification at the left base may represent atelectasis or developing pneumonic infiltrate.    Prior diet: per pt, regular diet & pt reports he uses dentures to eat but does not have dentures here    Subjective   Awake, seated in chair. Pt is hard of hearing.   "this is the first thing Sonali had to drink in 6 days"  NSG reports pt continuously requests ice chips    Pain/Comfort:  · Pain Rating 1: 0/10  · Pain Rating Post-Intervention 2: 0/10    Objective:   High flow nasal canula. MD reports pt will need to remain on high flow at ~8L for the next few days.     Oral Musculature " Evaluation  · Oral Musculature: WFL  · Dentition: edentulous  · Oral Labial Strength and Mobility: WFL  · Lingual Strength and Mobility: WFL  · Volitional Cough: adequate  · Volitional Swallow: adequate  · Voice Prior to PO Intake: clear    Bedside Swallow Eval:   Consistencies Assessed:  · Thin liquids ice chip x1, via spoon x1  · Nectar thick liquids via spoon x1, via small cup sips x2  · Puree 1 tsp x3     Oral Phase:   · WFL    Pharyngeal Phase:   · Cough following ice chip & thin via spoon  · delayed swallow initiation with all  · throat clearing following 2nd cup sip of nectar  · No overt s/s aspiration with puree    SLP educated pt regarding recs, precautions, risks of aspiration, s/s aspiration, role of SLP, POC, etc. Pt may require reinforcement to follow.     Assessment:     Krish Blackburn is a 87 y.o. male with an SLP diagnosis of Dysphagia.  He presents with risk of aspiration, compromised respiratory status     Goals:   Multidisciplinary Problems     SLP Goals        Problem: SLP Goal    Goal Priority Disciplines Outcome   SLP Goal     SLP    Description:  Speech Language Pathology Goals  Goals expected to be met by 2/13  1. Pt will participate in Modified Barium Swallow Study to objectively assess swallow & determine safety of PO intake/least restrictive PO consistencies                    Plan:     · Patient to be seen:  4 x/week   · Plan of Care expires:  03/07/19  · Plan of Care reviewed with:  patient   · SLP Follow-Up:  Yes       Discharge recommendations:  (return to NH)     Time Tracking:     SLP Treatment Date:   02/06/19  Speech Start Time:  1346  Speech Stop Time:  1402     Speech Total Time (min):  16 min    Billable Minutes: Eval Swallow and Oral Function 8 and Seld Care/Home Management Training 8    Melody Sim CCC-SLP  02/06/2019

## 2019-02-06 NOTE — PLAN OF CARE
Problem: Adult Inpatient Plan of Care  Goal: Plan of Care Review  Outcome: Ongoing (interventions implemented as appropriate)  Pt remains free from falls and injury. Pt in chair with chair alarm on, wheels locked, call bell in reach and telesitter at bedside. NPO status except ice chips maintained despite pt repeated request to have ice around the clock. POC discussed with pt who seems to be oriented except to situation. Pt has several bruises bilaterally on arms and skin tears on right arm with foam dresesings in place. H+H stable at this time. Will continue to monitor.

## 2019-02-06 NOTE — SUBJECTIVE & OBJECTIVE
Interval History: Stepped down from MICU with active problems of GI bleed, hypoxic respiratory failure, altered mentation and possible aspiration pneumonia    Review of Systems   Constitutional: Positive for fatigue. Negative for fever.   HENT: Negative for sore throat and trouble swallowing.    Eyes: Negative for visual disturbance.   Respiratory: Negative for cough and shortness of breath.    Cardiovascular: Negative for chest pain.   Gastrointestinal: Positive for blood in stool (Patient unsure). Negative for constipation, diarrhea, nausea and vomiting.   Endocrine: Negative for polydipsia, polyphagia and polyuria.   Genitourinary: Negative for dysuria.   Musculoskeletal: Negative for arthralgias.   Skin: Negative for wound.   Neurological: Negative for headaches.   Psychiatric/Behavioral: Positive for agitation and confusion.     Objective:     Vital Signs (Most Recent):  Temp: 97.6 °F (36.4 °C) (02/06/19 1130)  Pulse: 67 (02/06/19 1311)  Resp: 18 (02/06/19 1311)  BP: (!) 112/59 (02/06/19 1130)  SpO2: 96 % (02/06/19 1311) Vital Signs (24h Range):  Temp:  [97.3 °F (36.3 °C)-97.6 °F (36.4 °C)] 97.6 °F (36.4 °C)  Pulse:  [55-71] 67  Resp:  [15-27] 18  SpO2:  [86 %-100 %] 96 %  BP: ()/(54-79) 112/59     Weight: 50.6 kg (111 lb 8.8 oz)  Body mass index is 17.47 kg/m².    Intake/Output Summary (Last 24 hours) at 2/6/2019 1330  Last data filed at 2/6/2019 0600  Gross per 24 hour   Intake 480 ml   Output 0 ml   Net 480 ml      Physical Exam   Constitutional:   Thin, elderly male in no distress   HENT:   Head: Normocephalic and atraumatic.   Eyes: Right eye exhibits no discharge. Left eye exhibits no discharge. No scleral icterus.   Neck: Normal range of motion. Neck supple.   Cardiovascular: Normal rate and regular rhythm.   Pulmonary/Chest: Effort normal and breath sounds normal. No respiratory distress.   On 8L HFNC   Abdominal: Soft. Bowel sounds are normal. He exhibits no distension. There is no tenderness.  "There is no rebound and no guarding.   No JIMY performed today   Musculoskeletal: Normal range of motion. He exhibits edema (bilateral 1+ pitting edema ).   Neurological: He is alert.   Oriented to person, year and president. Did not know place, responded with "a dungeon"  Moves all extremities.   No gross CN deficits  No tremors   Skin:   Thin skin, bruising to bilateral arms  Seborrheic keratoses to scalp   Psychiatric:   Intermittently agitated, yelling that we are being "barbaric."   States that he is in a dungeon, worse when window opened.    Nursing note and vitals reviewed.      Significant Labs:   Recent Lab Results       02/06/19  0814   02/06/19  0457   02/05/19  2358   02/05/19  1941   02/05/19  1856        Immature Granulocytes 0.7 0.2 0.2 0.6       Immature Grans (Abs) 0.04  Comment:  Mild elevation in immature granulocytes is non specific and   can be seen in a variety of conditions including stress response,   acute inflammation, trauma and pregnancy. Correlation with other   laboratory and clinical findings is essential.   0.01  Comment:  Mild elevation in immature granulocytes is non specific and   can be seen in a variety of conditions including stress response,   acute inflammation, trauma and pregnancy. Correlation with other   laboratory and clinical findings is essential.   0.01  Comment:  Mild elevation in immature granulocytes is non specific and   can be seen in a variety of conditions including stress response,   acute inflammation, trauma and pregnancy. Correlation with other   laboratory and clinical findings is essential.   0.03  Comment:  Mild elevation in immature granulocytes is non specific and   can be seen in a variety of conditions including stress response,   acute inflammation, trauma and pregnancy. Correlation with other   laboratory and clinical findings is essential.         Albumin   2.1           Alkaline Phosphatase   67           ALT   13           Anion Gap   10     9     " AST   23           Baso # 0.02 0.02 0.01 0.02       Basophil% 0.3 0.4 0.2 0.4       Total Bilirubin   0.9  Comment:  For infants and newborns, interpretation of results should be based  on gestational age, weight and in agreement with clinical  observations.  Premature Infant recommended reference ranges:  Up to 24 hours.............<8.0 mg/dL  Up to 48 hours............<12.0 mg/dL  3-5 days..................<15.0 mg/dL  6-29 days.................<15.0 mg/dL             BUN, Bld   16     16     Calcium   8.1     8.2     Chloride   108     108     CO2   19     21     Creatinine   0.6     0.6     Differential Method Automated Automated Automated Automated       eGFR if    >60.0     >60.0     eGFR if non    >60.0  Comment:  Calculation used to obtain the estimated glomerular filtration  rate (eGFR) is the CKD-EPI equation.        >60.0  Comment:  Calculation used to obtain the estimated glomerular filtration  rate (eGFR) is the CKD-EPI equation.        Eos # 0.0 0.0 0.0 0.0       Eosinophil% 0.5 0.6 0.7 0.2       Glucose   66     100     Gran # (ANC) 3.7 3.3 3.2 3.8       Gran% 62.2 65.1 72.3 77.0       Hematocrit 31.2 30.6 26.7 28.6       Hemoglobin 9.8 9.4 8.5 9.1       Lymph # 0.9 0.6 0.4 0.3       Lymph% 15.2 12.1 8.9 6.6       Magnesium   2.6           MCH 28.6 29.6 29.8 30.0       MCHC 31.4 30.7 31.8 31.8       MCV 91 96 94 94       Mono # 1.3 1.1 0.8 0.8       Mono% 21.1 21.6 17.7 15.2       MPV 10.9 11.8 10.7 10.8       nRBC 0 0 0 0       Phosphorus   2.5           Platelets 115 84 111 111       Potassium   3.9     4.0     Total Protein   4.8           RBC 3.43 3.18 2.85 3.03       RDW 19.3 18.9 18.5 18.7       Sodium   137     138     WBC 5.92 5.05 4.36 4.99                        02/05/19  1652        Immature Granulocytes 0.4     Immature Grans (Abs) 0.02  Comment:  Mild elevation in immature granulocytes is non specific and   can be seen in a variety of conditions including  stress response,   acute inflammation, trauma and pregnancy. Correlation with other   laboratory and clinical findings is essential.       Albumin       Alkaline Phosphatase       ALT       Anion Gap       AST       Baso # 0.01     Basophil% 0.2     Total Bilirubin       BUN, Bld       Calcium       Chloride       CO2       Creatinine       Differential Method Automated     eGFR if        eGFR if non        Eos # 0.0     Eosinophil% 0.4     Glucose       Gran # (ANC) 4.4     Gran% 78.1     Hematocrit 29.7     Hemoglobin 9.5     Lymph # 0.4     Lymph% 6.5     Magnesium       MCH 29.9     MCHC 32.0     MCV 93     Mono # 0.8     Mono% 14.4     MPV 11.2     nRBC 0     Phosphorus       Platelets 114     Potassium       Total Protein       RBC 3.18     RDW 18.8     Sodium       WBC 5.68

## 2019-02-06 NOTE — NURSING TRANSFER
Nursing Transfer Note      2/6/2019     Transfer To: 3096    Transfer via bed    Transfer with 8L High Flow to O2, cardiac monitoring    Transported by RN's    Medicines sent: yes    Chart send with patient: yes    Notified: Staff RN, Charge RN    Patient reassessed at: 2/6, 0100     Upon arrival to floor: cardiac monitor applied, patient oriented to room, call bell in reach and bed in lowest position

## 2019-02-06 NOTE — NURSING TRANSFER
Nursing Transfer Note      2/6/2019     Transfer to 3096 from 6071    Transfer via bed    Transfer with 6L NC to O2, cardiac monitoring    Transported by EULOGIO Connor RN and EZEKIEL Vides RN    Medicines sent: protonix gtt plus two bags of protonix; IVPB clindamycin infusing    Chart send with patient: Yes    Notified: receiving JULIO C Carnes

## 2019-02-06 NOTE — PROGRESS NOTES
Pt arrive to floor escorted by staff. VSS. Mepilex x 2 to RUE clean dry intact. On 8L high flow nasal cannula. AAO x 3. Call light within reach.Bed in lowest position.

## 2019-02-06 NOTE — PROGRESS NOTES
[facility] Hospital Medicine  Progress Note    Patient Name: Krish Blackburn  MRN: [id number]  Patient Class: IP- Inpatient   Admission Date: 2/2/2019  Length of Stay: 4 days  Attending Physician: Fifi Adler MD  Primary Care Provider: Shaun Zapata MD    [facility] Medicine Team: [facility] MED 4 Alexandr Lester MD    Subjective:     Principal Problem:GI bleed    Interval History: Stepped down from MICU with active problems of GI bleed, hypoxic respiratory failure, altered mentation and possible aspiration pneumonia    Review of Systems   Constitutional: Positive for fatigue. Negative for fever.   HENT: Negative for sore throat and trouble swallowing.    Eyes: Negative for visual disturbance.   Respiratory: Negative for cough and shortness of breath.    Cardiovascular: Negative for chest pain.   Gastrointestinal: Positive for blood in stool (Patient unsure). Negative for constipation, diarrhea, nausea and vomiting.   Endocrine: Negative for polydipsia, polyphagia and polyuria.   Genitourinary: Negative for dysuria.   Musculoskeletal: Negative for arthralgias.   Skin: Negative for wound.   Neurological: Negative for headaches.   Psychiatric/Behavioral: Positive for agitation and confusion.     Objective:     Vital Signs (Most Recent):  Temp: 97.6 °F (36.4 °C) (02/06/19 1130)  Pulse: 67 (02/06/19 1311)  Resp: 18 (02/06/19 1311)  BP: (!) 112/59 (02/06/19 1130)  SpO2: 96 % (02/06/19 1311) Vital Signs (24h Range):  Temp:  [97.3 °F (36.3 °C)-97.6 °F (36.4 °C)] 97.6 °F (36.4 °C)  Pulse:  [55-71] 67  Resp:  [15-27] 18  SpO2:  [86 %-100 %] 96 %  BP: ()/(54-79) 112/59     Weight: 50.6 kg (111 lb 8.8 oz)  Body mass index is 17.47 kg/m².    Intake/Output Summary (Last 24 hours) at 2/6/2019 1330  Last data filed at 2/6/2019 0600  Gross per 24 hour   Intake 480 ml   Output 0 ml   Net 480 ml      Physical Exam   Constitutional:   Thin, elderly male in no distress   HENT:   Head: Normocephalic and  "atraumatic.   Eyes: Right eye exhibits no discharge. Left eye exhibits no discharge. No scleral icterus.   Neck: Normal range of motion. Neck supple.   Cardiovascular: Normal rate and regular rhythm.   Pulmonary/Chest: Effort normal and breath sounds normal. No respiratory distress.   On 8L HFNC   Abdominal: Soft. Bowel sounds are normal. He exhibits no distension. There is no tenderness. There is no rebound and no guarding.   No JIMY performed today   Musculoskeletal: Normal range of motion. He exhibits edema (bilateral 1+ pitting edema ).   Neurological: He is alert.   Oriented to person, year and president. Did not know place, responded with "a dungeon"  Moves all extremities.   No gross CN deficits  No tremors   Skin:   Thin skin, bruising to bilateral arms  Seborrheic keratoses to scalp   Psychiatric:   Intermittently agitated, yelling that we are being "barbaric."   States that he is in a dungeon, worse when window opened.    Nursing note and vitals reviewed.      Significant Labs:   Recent Lab Results       02/06/19  0814   02/06/19  0457   02/05/19  2358   02/05/19  1941   02/05/19  1856        Immature Granulocytes 0.7 0.2 0.2 0.6       Immature Grans (Abs) 0.04  Comment:  Mild elevation in immature granulocytes is non specific and   can be seen in a variety of conditions including stress response,   acute inflammation, trauma and pregnancy. Correlation with other   laboratory and clinical findings is essential.   0.01  Comment:  Mild elevation in immature granulocytes is non specific and   can be seen in a variety of conditions including stress response,   acute inflammation, trauma and pregnancy. Correlation with other   laboratory and clinical findings is essential.   0.01  Comment:  Mild elevation in immature granulocytes is non specific and   can be seen in a variety of conditions including stress response,   acute inflammation, trauma and pregnancy. Correlation with other   laboratory and clinical " findings is essential.   0.03  Comment:  Mild elevation in immature granulocytes is non specific and   can be seen in a variety of conditions including stress response,   acute inflammation, trauma and pregnancy. Correlation with other   laboratory and clinical findings is essential.         Albumin   2.1           Alkaline Phosphatase   67           ALT   13           Anion Gap   10     9     AST   23           Baso # 0.02 0.02 0.01 0.02       Basophil% 0.3 0.4 0.2 0.4       Total Bilirubin   0.9  Comment:  For infants and newborns, interpretation of results should be based  on gestational age, weight and in agreement with clinical  observations.  Premature Infant recommended reference ranges:  Up to 24 hours.............<8.0 mg/dL  Up to 48 hours............<12.0 mg/dL  3-5 days..................<15.0 mg/dL  6-29 days.................<15.0 mg/dL             BUN, Bld   16     16     Calcium   8.1     8.2     Chloride   108     108     CO2   19     21     Creatinine   0.6     0.6     Differential Method Automated Automated Automated Automated       eGFR if    >60.0     >60.0     eGFR if non    >60.0  Comment:  Calculation used to obtain the estimated glomerular filtration  rate (eGFR) is the CKD-EPI equation.        >60.0  Comment:  Calculation used to obtain the estimated glomerular filtration  rate (eGFR) is the CKD-EPI equation.        Eos # 0.0 0.0 0.0 0.0       Eosinophil% 0.5 0.6 0.7 0.2       Glucose   66     100     Gran # (ANC) 3.7 3.3 3.2 3.8       Gran% 62.2 65.1 72.3 77.0       Hematocrit 31.2 30.6 26.7 28.6       Hemoglobin 9.8 9.4 8.5 9.1       Lymph # 0.9 0.6 0.4 0.3       Lymph% 15.2 12.1 8.9 6.6       Magnesium   2.6           MCH 28.6 29.6 29.8 30.0       MCHC 31.4 30.7 31.8 31.8       MCV 91 96 94 94       Mono # 1.3 1.1 0.8 0.8       Mono% 21.1 21.6 17.7 15.2       MPV 10.9 11.8 10.7 10.8       nRBC 0 0 0 0       Phosphorus   2.5           Platelets 115 84 111 111        Potassium   3.9     4.0     Total Protein   4.8           RBC 3.43 3.18 2.85 3.03       RDW 19.3 18.9 18.5 18.7       Sodium   137     138     WBC 5.92 5.05 4.36 4.99                        02/05/19  1652        Immature Granulocytes 0.4     Immature Grans (Abs) 0.02  Comment:  Mild elevation in immature granulocytes is non specific and   can be seen in a variety of conditions including stress response,   acute inflammation, trauma and pregnancy. Correlation with other   laboratory and clinical findings is essential.       Albumin       Alkaline Phosphatase       ALT       Anion Gap       AST       Baso # 0.01     Basophil% 0.2     Total Bilirubin       BUN, Bld       Calcium       Chloride       CO2       Creatinine       Differential Method Automated     eGFR if        eGFR if non        Eos # 0.0     Eosinophil% 0.4     Glucose       Gran # (ANC) 4.4     Gran% 78.1     Hematocrit 29.7     Hemoglobin 9.5     Lymph # 0.4     Lymph% 6.5     Magnesium       MCH 29.9     MCHC 32.0     MCV 93     Mono # 0.8     Mono% 14.4     MPV 11.2     nRBC 0     Phosphorus       Platelets 114     Potassium       Total Protein       RBC 3.18     RDW 18.8     Sodium       WBC 5.68         Assessment/Plan:      * GI bleed      - At presentation, patient with dark blood in his pad and JIMY. Do not know if patient is on NSAIDs , but he does take ASA no other anticoagulants  - S/p 3 u PRBC in ED  - EGD performed day of presentation. Revealed gastritis/duodenitis and [place] Class IIc duodenal ulcer with flat pigmented spot, too large for intervention  - After drop in hemoglobin following EGD, CTA performed, no active bleeding seen therefore embolization not possible  - Hemoglobin has remained stable since CTA  - S/p PPI gtt x 72 hours, now on BID PPI IV  - Advancing diet as tolerated  - CBC BID  - GI following appreciate their recs   If rebleeds will consider repeating CTA     Encephalopathy    Unclear  baseline mentation but patient is a nursing home resident.   Oriented but thinking is not clear, gets agitated easily, poor understanding of situation  Dementia workup with TSH, vitamin studies, RPR  Will discuss with family       Acute on chronic respiratory failure with hypoxia    ASPIRATION PNEUMONIA    - patient with episodes of coughing with food   - consolidation on left base concerning for aspiration pneumonia  - on clindamycin   -SLP evaluation of safety for swallowing.        COPD (chronic obstructive pulmonary disease)    Likely contributing to respiratory failure  Goal O2 sats >88%  Duonebs while awake         VTE Risk Mitigation (From admission, onward)        Ordered     IP VTE HIGH RISK PATIENT  Once      02/02/19 1342     Reason for No Pharmacological VTE Prophylaxis  Once      02/02/19 1342     Reason for no Mechanical VTE Prophylaxis  Once      02/02/19 0503     Place sequential compression device  Until discontinued      02/02/19 0503            Alexandr Lester MD   Internal Medicine PGY-2  [phone]                    02/06/2019                             STAFF PHYSICIAN NOTE                                   Attending Attestation for Rounds with Resident  I have reviewed and concur with the resident's history, physical, assessment, and plan.  I have personally interviewed and examined the patient at bedside and agree with the resident's findings.                                  ________________________________________

## 2019-02-06 NOTE — PLAN OF CARE
Problem: Occupational Therapy Goal  Goal: Occupational Therapy Goal  Goals to be met by: 2/13     Patient will increase functional independence with ADLs by performing:    UE Dressing with Moderate Assistance.  LE Dressing with Maximum Assistance.  Grooming while seated with Stand-by Assistance.  Toileting from bedside commode with Maximum Assistance for hygiene and clothing management.   Sitting at edge of bed x15 minutes with Stand-by Assistance.  Rolling to Bilateral with Supervision.   Supine to sit with Minimal Assistance.  Stand pivot transfers with Minimal Assistance.     Outcome: Outcome(s) achieved Date Met: 02/06/19  Pt is at his functional baseline with no further acute OT needs.

## 2019-02-06 NOTE — PLAN OF CARE
Problem: SLP Goal  Goal: SLP Goal  Speech Language Pathology Goals  Goals expected to be met by 2/13  1. Pt will participate in Modified Barium Swallow Study to objectively assess swallow & determine safety of PO intake/least restrictive PO consistencies  SLP Clinical Swallow Evaluation completed. See note for details.

## 2019-02-06 NOTE — ASSESSMENT & PLAN NOTE
ASPIRATION PNEUMONIA    - patient with episodes of coughing with food   - consolidation on left base concerning for aspiration pneumonia  - on clindamycin   -SLP evaluation of safety for swallowing.

## 2019-02-06 NOTE — PLAN OF CARE
Problem: Adult Inpatient Plan of Care  Goal: Plan of Care Review  Outcome: Ongoing (interventions implemented as appropriate)    No acute events throughout day. See vital signs and assessments in flowsheets. See below for updates on today's progress.     Pulmonary: 7L high flow NC. Goal O2 88% and above.     Cardiovascular: NSR to sinus cristóbal 50s. Pulses palpable in all extremities. No c/o chest pain.    Neurological: Awake and alert. Disoriented to situation and time. Moves all extremities. Follows commands.    Gastrointestinal: NPO. Blood leaking from rectum. MD aware.     Genitourinary: Diapers.    Integumentary/Other: Up to side of bed with PT today. Preventive foam. SCDs. Foam to skin tears on right arm.    Infusions: Protonix gtt.    Plan to step down    Patient progressing towards goals as tolerated, plan of care communicated and reviewed with Krish Blackburn and family. All concerns addressed. Will continue to monitor.

## 2019-02-07 LAB
ALBUMIN SERPL BCP-MCNC: 2 G/DL
ALP SERPL-CCNC: 65 U/L
ALT SERPL W/O P-5'-P-CCNC: 10 U/L
ANION GAP SERPL CALC-SCNC: 9 MMOL/L
ANISOCYTOSIS BLD QL SMEAR: SLIGHT
AST SERPL-CCNC: 20 U/L
BASOPHILS # BLD AUTO: 0.01 K/UL
BASOPHILS # BLD AUTO: 0.02 K/UL
BASOPHILS NFR BLD: 0.2 %
BASOPHILS NFR BLD: 0.3 %
BILIRUB SERPL-MCNC: 0.8 MG/DL
BUN SERPL-MCNC: 16 MG/DL
CALCIUM SERPL-MCNC: 7.8 MG/DL
CHLORIDE SERPL-SCNC: 109 MMOL/L
CO2 SERPL-SCNC: 22 MMOL/L
CREAT SERPL-MCNC: 0.6 MG/DL
DIFFERENTIAL METHOD: ABNORMAL
DIFFERENTIAL METHOD: ABNORMAL
EOSINOPHIL # BLD AUTO: 0 K/UL
EOSINOPHIL # BLD AUTO: 0.1 K/UL
EOSINOPHIL NFR BLD: 0.2 %
EOSINOPHIL NFR BLD: 0.9 %
ERYTHROCYTE [DISTWIDTH] IN BLOOD BY AUTOMATED COUNT: 19.3 %
ERYTHROCYTE [DISTWIDTH] IN BLOOD BY AUTOMATED COUNT: 19.8 %
EST. GFR  (AFRICAN AMERICAN): >60 ML/MIN/1.73 M^2
EST. GFR  (NON AFRICAN AMERICAN): >60 ML/MIN/1.73 M^2
FOLATE SERPL-MCNC: 6.7 NG/ML
GLUCOSE SERPL-MCNC: 56 MG/DL
HCT VFR BLD AUTO: 29.3 %
HCT VFR BLD AUTO: 31.5 %
HGB BLD-MCNC: 9.6 G/DL
HGB BLD-MCNC: 9.8 G/DL
HIV1+2 IGG SERPL QL IA.RAPID: NEGATIVE
IMM GRANULOCYTES # BLD AUTO: 0.02 K/UL
IMM GRANULOCYTES # BLD AUTO: 0.08 K/UL
IMM GRANULOCYTES NFR BLD AUTO: 0.4 %
IMM GRANULOCYTES NFR BLD AUTO: 1.4 %
LYMPHOCYTES # BLD AUTO: 0.5 K/UL
LYMPHOCYTES # BLD AUTO: 0.8 K/UL
LYMPHOCYTES NFR BLD: 14 %
LYMPHOCYTES NFR BLD: 9 %
MAGNESIUM SERPL-MCNC: 1.8 MG/DL
MCH RBC QN AUTO: 28.3 PG
MCH RBC QN AUTO: 30.1 PG
MCHC RBC AUTO-ENTMCNC: 30.5 G/DL
MCHC RBC AUTO-ENTMCNC: 33.4 G/DL
MCV RBC AUTO: 90 FL
MCV RBC AUTO: 93 FL
MONOCYTES # BLD AUTO: 0.9 K/UL
MONOCYTES # BLD AUTO: 1.1 K/UL
MONOCYTES NFR BLD: 17.3 %
MONOCYTES NFR BLD: 18.3 %
NEUTROPHILS # BLD AUTO: 3.7 K/UL
NEUTROPHILS # BLD AUTO: 4 K/UL
NEUTROPHILS NFR BLD: 65.1 %
NEUTROPHILS NFR BLD: 72.9 %
NRBC BLD-RTO: 0 /100 WBC
NRBC BLD-RTO: 0 /100 WBC
PHOSPHATE SERPL-MCNC: 2.4 MG/DL
PLATELET # BLD AUTO: 141 K/UL
PLATELET # BLD AUTO: 144 K/UL
PLATELET BLD QL SMEAR: ABNORMAL
PMV BLD AUTO: 10.8 FL
PMV BLD AUTO: 11.4 FL
POCT GLUCOSE: 107 MG/DL (ref 70–110)
POCT GLUCOSE: 145 MG/DL (ref 70–110)
POCT GLUCOSE: 62 MG/DL (ref 70–110)
POCT GLUCOSE: 80 MG/DL (ref 70–110)
POTASSIUM SERPL-SCNC: 3.4 MMOL/L
PROT SERPL-MCNC: 4.3 G/DL
RBC # BLD AUTO: 3.26 M/UL
RBC # BLD AUTO: 3.39 M/UL
RPR SER QL: NORMAL
SODIUM SERPL-SCNC: 140 MMOL/L
TSH SERPL DL<=0.005 MIU/L-ACNC: 1.98 UIU/ML
VIT B12 SERPL-MCNC: 1737 PG/ML
WBC # BLD AUTO: 5.43 K/UL
WBC # BLD AUTO: 5.73 K/UL

## 2019-02-07 PROCEDURE — 80053 COMPREHEN METABOLIC PANEL: CPT

## 2019-02-07 PROCEDURE — C9113 INJ PANTOPRAZOLE SODIUM, VIA: HCPCS | Performed by: INTERNAL MEDICINE

## 2019-02-07 PROCEDURE — 27100171 HC OXYGEN HIGH FLOW UP TO 24 HOURS

## 2019-02-07 PROCEDURE — 86592 SYPHILIS TEST NON-TREP QUAL: CPT

## 2019-02-07 PROCEDURE — 82746 ASSAY OF FOLIC ACID SERUM: CPT

## 2019-02-07 PROCEDURE — 97530 THERAPEUTIC ACTIVITIES: CPT

## 2019-02-07 PROCEDURE — 25000003 PHARM REV CODE 250: Performed by: STUDENT IN AN ORGANIZED HEALTH CARE EDUCATION/TRAINING PROGRAM

## 2019-02-07 PROCEDURE — 84425 ASSAY OF VITAMIN B-1: CPT

## 2019-02-07 PROCEDURE — 83735 ASSAY OF MAGNESIUM: CPT

## 2019-02-07 PROCEDURE — 84100 ASSAY OF PHOSPHORUS: CPT

## 2019-02-07 PROCEDURE — 25000242 PHARM REV CODE 250 ALT 637 W/ HCPCS: Performed by: STUDENT IN AN ORGANIZED HEALTH CARE EDUCATION/TRAINING PROGRAM

## 2019-02-07 PROCEDURE — 11000001 HC ACUTE MED/SURG PRIVATE ROOM

## 2019-02-07 PROCEDURE — 86703 HIV-1/HIV-2 1 RESULT ANTBDY: CPT

## 2019-02-07 PROCEDURE — 85025 COMPLETE CBC W/AUTO DIFF WBC: CPT

## 2019-02-07 PROCEDURE — 99233 PR SUBSEQUENT HOSPITAL CARE,LEVL III: ICD-10-PCS | Mod: ,,, | Performed by: HOSPITALIST

## 2019-02-07 PROCEDURE — 94761 N-INVAS EAR/PLS OXIMETRY MLT: CPT

## 2019-02-07 PROCEDURE — 92526 ORAL FUNCTION THERAPY: CPT

## 2019-02-07 PROCEDURE — 36415 COLL VENOUS BLD VENIPUNCTURE: CPT

## 2019-02-07 PROCEDURE — 27000221 HC OXYGEN, UP TO 24 HOURS

## 2019-02-07 PROCEDURE — 27100092 HC HIGH FLOW DELIVERY CANNULA

## 2019-02-07 PROCEDURE — 99233 SBSQ HOSP IP/OBS HIGH 50: CPT | Mod: ,,, | Performed by: HOSPITALIST

## 2019-02-07 PROCEDURE — 63600175 PHARM REV CODE 636 W HCPCS: Performed by: STUDENT IN AN ORGANIZED HEALTH CARE EDUCATION/TRAINING PROGRAM

## 2019-02-07 PROCEDURE — 94668 MNPJ CHEST WALL SBSQ: CPT

## 2019-02-07 PROCEDURE — 84443 ASSAY THYROID STIM HORMONE: CPT

## 2019-02-07 PROCEDURE — 82607 VITAMIN B-12: CPT

## 2019-02-07 PROCEDURE — 94640 AIRWAY INHALATION TREATMENT: CPT

## 2019-02-07 PROCEDURE — 63600175 PHARM REV CODE 636 W HCPCS: Performed by: INTERNAL MEDICINE

## 2019-02-07 PROCEDURE — S0077 INJECTION, CLINDAMYCIN PHOSP: HCPCS | Performed by: STUDENT IN AN ORGANIZED HEALTH CARE EDUCATION/TRAINING PROGRAM

## 2019-02-07 PROCEDURE — 97535 SELF CARE MNGMENT TRAINING: CPT

## 2019-02-07 PROCEDURE — 25000003 PHARM REV CODE 250

## 2019-02-07 RX ORDER — POTASSIUM CHLORIDE 7.45 MG/ML
10 INJECTION INTRAVENOUS ONCE
Status: COMPLETED | OUTPATIENT
Start: 2019-02-07 | End: 2019-02-07

## 2019-02-07 RX ORDER — SODIUM CHLORIDE 9 MG/ML
INJECTION, SOLUTION INTRAVENOUS CONTINUOUS
Status: ACTIVE | OUTPATIENT
Start: 2019-02-07 | End: 2019-02-08

## 2019-02-07 RX ORDER — DEXTROSE 50 % IN WATER (D50W) INTRAVENOUS SYRINGE
Status: COMPLETED
Start: 2019-02-07 | End: 2019-02-07

## 2019-02-07 RX ADMIN — DEXTROSE MONOHYDRATE 12.5 G: 500 INJECTION PARENTERAL at 05:02

## 2019-02-07 RX ADMIN — IPRATROPIUM BROMIDE AND ALBUTEROL SULFATE 3 ML: .5; 3 SOLUTION RESPIRATORY (INHALATION) at 01:02

## 2019-02-07 RX ADMIN — IPRATROPIUM BROMIDE AND ALBUTEROL SULFATE 3 ML: .5; 3 SOLUTION RESPIRATORY (INHALATION) at 06:02

## 2019-02-07 RX ADMIN — CLINDAMYCIN IN 5 PERCENT DEXTROSE 600 MG: 12 INJECTION, SOLUTION INTRAVENOUS at 04:02

## 2019-02-07 RX ADMIN — CLINDAMYCIN IN 5 PERCENT DEXTROSE 600 MG: 12 INJECTION, SOLUTION INTRAVENOUS at 07:02

## 2019-02-07 RX ADMIN — PANTOPRAZOLE SODIUM 40 MG: 40 INJECTION, POWDER, FOR SOLUTION INTRAVENOUS at 09:02

## 2019-02-07 RX ADMIN — SODIUM CHLORIDE: 0.9 INJECTION, SOLUTION INTRAVENOUS at 08:02

## 2019-02-07 RX ADMIN — CLINDAMYCIN IN 5 PERCENT DEXTROSE 600 MG: 12 INJECTION, SOLUTION INTRAVENOUS at 11:02

## 2019-02-07 RX ADMIN — POTASSIUM CHLORIDE 10 MEQ: 10 INJECTION, SOLUTION INTRAVENOUS at 08:02

## 2019-02-07 RX ADMIN — IPRATROPIUM BROMIDE AND ALBUTEROL SULFATE 3 ML: .5; 3 SOLUTION RESPIRATORY (INHALATION) at 08:02

## 2019-02-07 RX ADMIN — DEXTROSE MONOHYDRATE: 25 INJECTION, SOLUTION INTRAVENOUS at 07:02

## 2019-02-07 RX ADMIN — POTASSIUM PHOSPHATE, MONOBASIC AND POTASSIUM PHOSPHATE, DIBASIC 15 MMOL: 224; 236 INJECTION, SOLUTION, CONCENTRATE INTRAVENOUS at 09:02

## 2019-02-07 NOTE — PT/OT/SLP PROGRESS
"Physical Therapy Treatment    Patient Name:  Krish Blackburn   MRN:  [id number]    Recommendations:     Discharge Recommendations:  (Return to NH )   Discharge Equipment Recommendations: none   Barriers to discharge: None    Assessment:     Krish Blackburn is a 87 y.o. male admitted with a medical diagnosis of GI bleed.  He presents with the following impairments/functional limitations:  weakness, impaired endurance, impaired functional mobilty, gait instability, impaired balance, impaired self care skills, impaired cardiopulmonary response to activity. Pt tolerated tx well with focus on bed mobility, transfers, and gait training. Pt progressing well with decreased assistance needed for all mobility and pt able to tolerate small steps with transfer to bedside chair. Pt remains limited with safety for OOB mobility by posterior lean in sustained standing and attempted ambulation. Pt will continue to benefit from therapy services to improve impairments listed above.     Rehab Prognosis: Fair; patient would benefit from acute skilled PT services to address these deficits and reach maximum level of function.    Recent Surgery: Procedure(s) (LRB):  EGD (ESOPHAGOGASTRODUODENOSCOPY) (N/A) 5 Days Post-Op    Plan:     During this hospitalization, patient to be seen 3 x/week to address the identified rehab impairments via gait training, therapeutic activities, therapeutic exercises, neuromuscular re-education and progress toward the following goals:    · Plan of Care Expires:  02/05/19    Subjective     Chief Complaint: no c/o  Patient/Family Comments/goals: "I feel so much better out of that bed."   Pain/Comfort:  · Pain Rating 1: 0/10  · Pain Rating Post-Intervention 1: 0/10      Objective:     Communicated with NSG prior to session.  Patient found all lines intact, call button in reach and pt supine in bed with telemetry  upon PTA entry to room.     General Precautions: Standard, aspiration, NPO, respiratory, fall   Orthopedic " Precautions:N/A   Braces: N/A     Functional Mobility:  · Bed Mobility:     · Rolling Left:  minimum assistance  · Scooting: minimum assistance  · Supine to Sit: minimum assistance  · Transfers:     · Sit to Stand:  moderate assistance with rolling walker  · Gait: Pt takes steps with transfer to bedside chair  · Balance: Pt sits with SBA at EOB. Pt stands with Min/Mod A for balance and safety.       AM-PAC 6 CLICK MOBILITY  Turning over in bed (including adjusting bedclothes, sheets and blankets)?: 3  Sitting down on and standing up from a chair with arms (e.g., wheelchair, bedside commode, etc.): 2  Moving from lying on back to sitting on the side of the bed?: 2  Moving to and from a bed to a chair (including a wheelchair)?: 2  Need to walk in hospital room?: 2  Climbing 3-5 steps with a railing?: 1  Basic Mobility Total Score: 12       Therapeutic Activities and Exercises:   Pt assisted with functional mobility as noted above.   Pt educated on safety with all mobility, need to call for assistance, and PT POC.     Patient left up in chair with all lines intact, call button in reach and chair alarm on.    GOALS:   Multidisciplinary Problems     Physical Therapy Goals        Problem: Physical Therapy Goal    Goal Priority Disciplines Outcome Goal Variances Interventions   Physical Therapy Goal     PT, PT/OT Ongoing (interventions implemented as appropriate)     Description:  Goals to be met by: 2019     Patient will increase functional independence with mobility by performin. Supine to sit with Set-up [place]  2. Sit to stand transfer with Contact Guard Assistance  3. Bed to chair transfer with Contact Guard Assistance using LRAD  4. Lower extremity exercise program x15 reps per handout, with assistance as needed                      Time Tracking:     PT Received On: 19  PT Start Time: 1328     PT Stop Time: 1351  PT Total Time (min): 23 min     Billable Minutes: Therapeutic Activity  23    Treatment Type: Treatment  PT/PTA: PTA     PTA Visit Number: 1     Jairo Khan, LUC  02/07/2019

## 2019-02-07 NOTE — PLAN OF CARE
Problem: Physical Therapy Goal  Goal: Physical Therapy Goal  Goals to be met by: 2019     Patient will increase functional independence with mobility by performin. Supine to sit with Set-up [place]  2. Sit to stand transfer with Contact Guard Assistance  3. Bed to chair transfer with Contact Guard Assistance using LRAD  4. Lower extremity exercise program x15 reps per handout, with assistance as needed     Goal remain appropriate.

## 2019-02-07 NOTE — SUBJECTIVE & OBJECTIVE
Interval History: Patient oriented x 3 on exam but stated that he is not sure why he is in the hospital. No report of bloody BMs. Denies chest pain, abdominal pain or light-headedness.    Review of Systems   Constitutional: Negative for chills and fever.   HENT: Positive for trouble swallowing.    Eyes: Negative for visual disturbance.   Respiratory: Negative for cough and shortness of breath.    Gastrointestinal: Negative for abdominal pain, nausea and vomiting.   Genitourinary: Negative for dysuria.   Musculoskeletal: Negative for back pain.   Neurological: Negative for headaches.     Objective:     Vital Signs (Most Recent):  Temp: 97.6 °F (36.4 °C) (02/07/19 0811)  Pulse: 72 (02/07/19 1318)  Resp: 18 (02/07/19 1318)  BP: 136/70 (02/07/19 1146)  SpO2: (!) 89 % (02/07/19 1318) Vital Signs (24h Range):  Temp:  [96.1 °F (35.6 °C)-98.2 °F (36.8 °C)] 97.6 °F (36.4 °C)  Pulse:  [68-84] 72  Resp:  [16-19] 18  SpO2:  [89 %-98 %] 89 %  BP: (100-136)/(55-77) 136/70     Weight: 50.6 kg (111 lb 8.8 oz)  Body mass index is 17.47 kg/m².    Intake/Output Summary (Last 24 hours) at 2/7/2019 1559  Last data filed at 2/6/2019 1600  Gross per 24 hour   Intake 0 ml   Output --   Net 0 ml      Physical Exam   Constitutional: He is oriented to person, place, and time. He appears well-developed and well-nourished.   HENT:   Head: Normocephalic and atraumatic.   Eyes: Conjunctivae are normal. Right eye exhibits no discharge. Left eye exhibits no discharge.   Cardiovascular: Normal rate and regular rhythm. Exam reveals no gallop and no friction rub.   No murmur heard.  Pulmonary/Chest: Effort normal and breath sounds normal. No stridor. He has no wheezes. He has no rales.   Abdominal: Soft. Bowel sounds are normal. He exhibits no mass. There is no tenderness. There is no rebound.   Neurological: He is alert and oriented to person, place, and time.   He is oriented x 3; Note sure why he is in the hospital   Skin: Skin is warm and dry.        Significant Labs:   CBC:   Recent Labs   Lab 02/06/19  0814 02/06/19  1845 02/07/19  0801   WBC 5.92 6.04 5.43   HGB 9.8* 11.2* 9.6*   HCT 31.2* 33.3* 31.5*   * 143* 144*     CMP:   Recent Labs   Lab 02/05/19  1856 02/06/19  0457 02/07/19  0332    137 140   K 4.0 3.9 3.4*    108 109   CO2 21* 19* 22*    66* 56*   BUN 16 16 16   CREATININE 0.6 0.6 0.6   CALCIUM 8.2* 8.1* 7.8*   PROT  --  4.8* 4.3*   ALBUMIN  --  2.1* 2.0*   BILITOT  --  0.9 0.8   ALKPHOS  --  67 65   AST  --  23 20   ALT  --  13 10   ANIONGAP 9 10 9   EGFRNONAA >60.0 >60.0 >60.0

## 2019-02-07 NOTE — PLAN OF CARE
Problem: Adult Inpatient Plan of Care  Goal: Plan of Care Review  Outcome: Ongoing (interventions implemented as appropriate)   02/07/19 3220   Plan of Care Review   Plan of Care Reviewed With patient     Pt oriented to person and place. [place] of hearing bilat, no hearing aid present at this time. Continues on 8 L NC, sating 92%, no SOB noted. VSS.  Denies any pain and discomfort at this time. Tele on, normal sinus. Remain NPO. Safety maintained. Placed CB to easy reach and bed locked and lowest position. Will monitor.

## 2019-02-07 NOTE — PT/OT/SLP PROGRESS
"Speech Language Pathology Treatment    Patient Name:  Krish Blackburn   MRN:  [id number]  Admitting Diagnosis: GI bleed. Pt s/p EGD (2/3/2019) with findings of ulcer, gastritis also pertinent to this visit.     Recommendations:                 General Recommendations:  Modified barium swallow study  Diet recommendations:  NPO, Liquid Diet Level: NPO   Aspiration Precautions: Continue alternate means of nutrition and Strict aspiration precautions - remain elevated at 30 degree angle or higher when sleeping  General Precautions: Standard, aspiration, NPO, respiratory, fall, hearing impaired  Communication strategies:  provide increased time to answer, go to room if call light pushed and use increased intensity as Pt [place]     Subjective     SLP reviewed Pt with Pt  Pt presents calm, cooperative  He explains, "Its not that it is hard to swallow, its like there is something that sticks right here [re:globus sensation]"  He denies pain  Patient goals: to drink awater    Pain/Comfort:  · Pain Rating 1: 1/10  · Location - Orientation 1: generalized  · Location 1: other (see comments)(globus sensation)  · Pain Addressed 1: Cessation of Activity, Reposition  · Pain Rating Post-Intervention 1: 1/10    Objective:     Has the patient been evaluated by SLP for swallowing?   Yes  Keep patient NPO? Yes   Current Respiratory Status: nasal cannula    Last CXR: 2/4/19: Increasing opacification at the left base may represent atelectasis or developing pneumonic infiltrate.    Pt seen for ongoing swallow assessment. He was found asleep in bed with nasal canula.  He woke per simple verbal cues. He endorsed hunger, thirst.  He was alert and oriented x3. He followed commands and attended to ST provided occasional verbal repetition/visual cues for clarification 2/2 Pt [place]. Pt demonstrated clear vocal quality and intensity yet mildly reduced cough upon command.  Labial/lingual/mandibular strength and coordination intact upon review of the oral " mechanism.  Pt seen with PO presentations of thin liquids (cup edge sips x3,) nectar-thickened liquids (cup edge sips x3) and puree (tsp bites x3.)  Pt with immediate cough/choking on cup edge sip thin liquids x1 with unproductive cough.  Pt with delayed, wet change in vocal quality with delayed throat clears following presentations of nectar-thickened liquids and puree.  Pt with complain of globus sensation as viscosity increased. Additional trials held 2/2 esophageal concerns for Dysphagia.  Pt educated on swallow anatomy, phases of swallow, Dysphagia, and role of MBSS.  He verbalized partial understanding ; however, unable to complete full teachback 2/2 underlying cognitive status.  Pt remained upright in bed with call light in reach upon SLP exit from room.  No additional questions.  Whiteboard updated.     Assessment:     Krish Blackburn is a 87 y.o. male with an SLP diagnosis of esophageal concerns for Dysphagia.  He would benefit from MBSS for further,objective assessment of swallow fx to determine feasibility of re-initiation of PO intake.     Goals:   Multidisciplinary Problems     SLP Goals        Problem: SLP Goal    Goal Priority Disciplines Outcome   SLP Goal     SLP Ongoing (interventions implemented as appropriate)   Description:  Speech Language Pathology Goals  Goals expected to be met by 2/13  1. Pt will participate in Modified Barium Swallow Study to objectively assess swallow & determine safety of PO intake/least restrictive PO consistencies                    Plan:     · Patient to be seen:  4 x/week   · Plan of Care expires:  03/07/19  · Plan of Care reviewed with:  patient   · SLP Follow-Up:  Yes       Discharge recommendations:  (return to NH)   Barriers to Discharge:  means of nutrition/hydration not yet established    Time Tracking:     SLP Treatment Date:   02/07/19  Speech Start Time:  0933  Speech Stop Time:  0958     Speech Total Time (min):  25 min    Billable Minutes: Treatment  Swallowing Dysfunction 13 and Seld Care/Home Management Training 15    SCOTTIE Magdaleno., [facility]-SLP  Speech-Language Pathology  Pager: [phone]      02/07/2019

## 2019-02-07 NOTE — PLAN OF CARE
Problem: SLP Goal  Goal: SLP Goal  Speech Language Pathology Goals  Goals expected to be met by 2/13  1. Pt will participate in Modified Barium Swallow Study to objectively assess swallow & determine safety of PO intake/least restrictive PO consistencies   Outcome: Ongoing (interventions implemented as appropriate)  Pt seen for speech therapy. He demonstrated overt S/S aspiration with PO presentations this service day. He would benefit from objective swallow assessment via MBSS when feasible to determine safety/feasibility of re-initiation of PO intake. MD team paged to review findings/recs. Please order MBSS if in agreement. Thank you.    SCOTTIE Magdaleno., [facility]SLP  Speech-Language Pathology  Pager: [phone]  2/7/2019

## 2019-02-08 VITALS
DIASTOLIC BLOOD PRESSURE: 69 MMHG | TEMPERATURE: 98 F | HEIGHT: 67 IN | RESPIRATION RATE: 18 BRPM | BODY MASS INDEX: 17.51 KG/M2 | OXYGEN SATURATION: 90 % | WEIGHT: 111.56 LBS | SYSTOLIC BLOOD PRESSURE: 111 MMHG | HEART RATE: 84 BPM

## 2019-02-08 LAB
ALBUMIN SERPL BCP-MCNC: 2 G/DL
ALP SERPL-CCNC: 66 U/L
ALT SERPL W/O P-5'-P-CCNC: 13 U/L
ANION GAP SERPL CALC-SCNC: 9 MMOL/L
AST SERPL-CCNC: 23 U/L
BACTERIA BLD CULT: NORMAL
BACTERIA BLD CULT: NORMAL
BASOPHILS # BLD AUTO: 0.01 K/UL
BASOPHILS # BLD AUTO: 0.01 K/UL
BASOPHILS NFR BLD: 0.2 %
BASOPHILS NFR BLD: 0.2 %
BILIRUB SERPL-MCNC: 0.9 MG/DL
BUN SERPL-MCNC: 13 MG/DL
CALCIUM SERPL-MCNC: 7.7 MG/DL
CHLORIDE SERPL-SCNC: 109 MMOL/L
CO2 SERPL-SCNC: 22 MMOL/L
CREAT SERPL-MCNC: 0.6 MG/DL
DIFFERENTIAL METHOD: ABNORMAL
DIFFERENTIAL METHOD: ABNORMAL
EOSINOPHIL # BLD AUTO: 0.1 K/UL
EOSINOPHIL # BLD AUTO: 0.1 K/UL
EOSINOPHIL NFR BLD: 1 %
EOSINOPHIL NFR BLD: 1.1 %
ERYTHROCYTE [DISTWIDTH] IN BLOOD BY AUTOMATED COUNT: 19.7 %
ERYTHROCYTE [DISTWIDTH] IN BLOOD BY AUTOMATED COUNT: 19.9 %
EST. GFR  (AFRICAN AMERICAN): >60 ML/MIN/1.73 M^2
EST. GFR  (NON AFRICAN AMERICAN): >60 ML/MIN/1.73 M^2
GLUCOSE SERPL-MCNC: 68 MG/DL
HCT VFR BLD AUTO: 27.7 %
HCT VFR BLD AUTO: 28 %
HGB BLD-MCNC: 8.7 G/DL
HGB BLD-MCNC: 8.7 G/DL
IMM GRANULOCYTES # BLD AUTO: 0.02 K/UL
IMM GRANULOCYTES # BLD AUTO: 0.02 K/UL
IMM GRANULOCYTES NFR BLD AUTO: 0.4 %
IMM GRANULOCYTES NFR BLD AUTO: 0.4 %
LYMPHOCYTES # BLD AUTO: 0.6 K/UL
LYMPHOCYTES # BLD AUTO: 0.6 K/UL
LYMPHOCYTES NFR BLD: 11.1 %
LYMPHOCYTES NFR BLD: 12.4 %
MAGNESIUM SERPL-MCNC: 1.4 MG/DL
MCH RBC QN AUTO: 28.3 PG
MCH RBC QN AUTO: 28.5 PG
MCHC RBC AUTO-ENTMCNC: 31.1 G/DL
MCHC RBC AUTO-ENTMCNC: 31.4 G/DL
MCV RBC AUTO: 91 FL
MCV RBC AUTO: 91 FL
MONOCYTES # BLD AUTO: 0.8 K/UL
MONOCYTES # BLD AUTO: 0.9 K/UL
MONOCYTES NFR BLD: 17 %
MONOCYTES NFR BLD: 17.1 %
NEUTROPHILS # BLD AUTO: 3.3 K/UL
NEUTROPHILS # BLD AUTO: 3.5 K/UL
NEUTROPHILS NFR BLD: 68.9 %
NEUTROPHILS NFR BLD: 70.2 %
NRBC BLD-RTO: 0 /100 WBC
NRBC BLD-RTO: 0 /100 WBC
PHOSPHATE SERPL-MCNC: 2.8 MG/DL
PLATELET # BLD AUTO: 143 K/UL
PLATELET # BLD AUTO: 153 K/UL
PMV BLD AUTO: 10.3 FL
PMV BLD AUTO: 10.7 FL
POCT GLUCOSE: 96 MG/DL (ref 70–110)
POCT GLUCOSE: 96 MG/DL (ref 70–110)
POTASSIUM SERPL-SCNC: 3.6 MMOL/L
PROT SERPL-MCNC: 4.5 G/DL
RBC # BLD AUTO: 3.05 M/UL
RBC # BLD AUTO: 3.07 M/UL
SODIUM SERPL-SCNC: 140 MMOL/L
WBC # BLD AUTO: 4.76 K/UL
WBC # BLD AUTO: 4.97 K/UL

## 2019-02-08 PROCEDURE — 80053 COMPREHEN METABOLIC PANEL: CPT

## 2019-02-08 PROCEDURE — 25000003 PHARM REV CODE 250: Performed by: STUDENT IN AN ORGANIZED HEALTH CARE EDUCATION/TRAINING PROGRAM

## 2019-02-08 PROCEDURE — 83735 ASSAY OF MAGNESIUM: CPT

## 2019-02-08 PROCEDURE — 85025 COMPLETE CBC W/AUTO DIFF WBC: CPT | Mod: 91

## 2019-02-08 PROCEDURE — 97535 SELF CARE MNGMENT TRAINING: CPT

## 2019-02-08 PROCEDURE — 36415 COLL VENOUS BLD VENIPUNCTURE: CPT

## 2019-02-08 PROCEDURE — C9113 INJ PANTOPRAZOLE SODIUM, VIA: HCPCS | Performed by: INTERNAL MEDICINE

## 2019-02-08 PROCEDURE — 92611 MOTION FLUOROSCOPY/SWALLOW: CPT

## 2019-02-08 PROCEDURE — 63600175 PHARM REV CODE 636 W HCPCS: Performed by: INTERNAL MEDICINE

## 2019-02-08 PROCEDURE — 25000242 PHARM REV CODE 250 ALT 637 W/ HCPCS: Performed by: STUDENT IN AN ORGANIZED HEALTH CARE EDUCATION/TRAINING PROGRAM

## 2019-02-08 PROCEDURE — S5010 5% DEXTROSE AND 0.45% SALINE: HCPCS | Performed by: STUDENT IN AN ORGANIZED HEALTH CARE EDUCATION/TRAINING PROGRAM

## 2019-02-08 PROCEDURE — 27000221 HC OXYGEN, UP TO 24 HOURS

## 2019-02-08 PROCEDURE — 63600175 PHARM REV CODE 636 W HCPCS: Performed by: STUDENT IN AN ORGANIZED HEALTH CARE EDUCATION/TRAINING PROGRAM

## 2019-02-08 PROCEDURE — 94761 N-INVAS EAR/PLS OXIMETRY MLT: CPT

## 2019-02-08 PROCEDURE — 94640 AIRWAY INHALATION TREATMENT: CPT

## 2019-02-08 PROCEDURE — 99233 PR SUBSEQUENT HOSPITAL CARE,LEVL III: ICD-10-PCS | Mod: ,,, | Performed by: HOSPITALIST

## 2019-02-08 PROCEDURE — 99233 SBSQ HOSP IP/OBS HIGH 50: CPT | Mod: ,,, | Performed by: HOSPITALIST

## 2019-02-08 PROCEDURE — S0077 INJECTION, CLINDAMYCIN PHOSP: HCPCS | Performed by: STUDENT IN AN ORGANIZED HEALTH CARE EDUCATION/TRAINING PROGRAM

## 2019-02-08 PROCEDURE — 84100 ASSAY OF PHOSPHORUS: CPT

## 2019-02-08 RX ORDER — PANTOPRAZOLE SODIUM 40 MG/1
40 TABLET, DELAYED RELEASE ORAL 2 TIMES DAILY
Qty: 60 TABLET | Refills: 11
Start: 2019-02-08 | End: 2019-02-08 | Stop reason: SDUPTHER

## 2019-02-08 RX ORDER — DEXTROSE MONOHYDRATE 50 MG/ML
INJECTION, SOLUTION INTRAVENOUS CONTINUOUS
Status: DISCONTINUED | OUTPATIENT
Start: 2019-02-08 | End: 2019-02-08

## 2019-02-08 RX ORDER — CLINDAMYCIN HYDROCHLORIDE 75 MG/1
75 CAPSULE ORAL EVERY 12 HOURS
Qty: 6 CAPSULE | Refills: 0
Start: 2019-02-08 | End: 2019-02-08 | Stop reason: HOSPADM

## 2019-02-08 RX ORDER — PANTOPRAZOLE SODIUM 40 MG/1
40 TABLET, DELAYED RELEASE ORAL
Qty: 60 TABLET | Refills: 11
Start: 2019-02-08 | End: 2020-02-08

## 2019-02-08 RX ORDER — AMOXICILLIN AND CLAVULANATE POTASSIUM 875; 125 MG/1; MG/1
1 TABLET, FILM COATED ORAL 2 TIMES DAILY
Qty: 6 TABLET | Refills: 0
Start: 2019-02-08 | End: 2019-02-11

## 2019-02-08 RX ORDER — SODIUM CHLORIDE 9 MG/ML
INJECTION, SOLUTION INTRAVENOUS CONTINUOUS
Status: DISCONTINUED | OUTPATIENT
Start: 2019-02-08 | End: 2019-02-08

## 2019-02-08 RX ORDER — MAGNESIUM SULFATE HEPTAHYDRATE 40 MG/ML
2 INJECTION, SOLUTION INTRAVENOUS
Status: COMPLETED | OUTPATIENT
Start: 2019-02-08 | End: 2019-02-08

## 2019-02-08 RX ORDER — DEXTROSE MONOHYDRATE AND SODIUM CHLORIDE 5; .45 G/100ML; G/100ML
INJECTION, SOLUTION INTRAVENOUS CONTINUOUS
Status: DISCONTINUED | OUTPATIENT
Start: 2019-02-08 | End: 2019-02-08 | Stop reason: HOSPADM

## 2019-02-08 RX ADMIN — DEXTROSE AND SODIUM CHLORIDE: 5; .45 INJECTION, SOLUTION INTRAVENOUS at 11:02

## 2019-02-08 RX ADMIN — IPRATROPIUM BROMIDE AND ALBUTEROL SULFATE 3 ML: .5; 3 SOLUTION RESPIRATORY (INHALATION) at 02:02

## 2019-02-08 RX ADMIN — CLINDAMYCIN IN 5 PERCENT DEXTROSE 600 MG: 12 INJECTION, SOLUTION INTRAVENOUS at 08:02

## 2019-02-08 RX ADMIN — CLINDAMYCIN IN 5 PERCENT DEXTROSE 600 MG: 12 INJECTION, SOLUTION INTRAVENOUS at 03:02

## 2019-02-08 RX ADMIN — IPRATROPIUM BROMIDE AND ALBUTEROL SULFATE 3 ML: .5; 3 SOLUTION RESPIRATORY (INHALATION) at 07:02

## 2019-02-08 RX ADMIN — DEXTROSE MONOHYDRATE 12.5 G: 500 INJECTION PARENTERAL at 06:02

## 2019-02-08 RX ADMIN — MAGNESIUM SULFATE IN WATER 2 G: 40 INJECTION, SOLUTION INTRAVENOUS at 11:02

## 2019-02-08 RX ADMIN — PANTOPRAZOLE SODIUM 40 MG: 40 INJECTION, POWDER, FOR SOLUTION INTRAVENOUS at 11:02

## 2019-02-08 NOTE — PROGRESS NOTES
Patient to be discharged to [facility]. Report called to Eveline ([phone]). Patient in room waiting for transportation. Will continue to monitor.

## 2019-02-08 NOTE — PLAN OF CARE
Problem: Adult Inpatient Plan of Care  Goal: Plan of Care Review  Outcome: Ongoing (interventions implemented as appropriate)   02/08/19 0252   Plan of Care Review   Plan of Care Reviewed With patient     Pt remain free from fall and injuries. Continues on iv ABT and and 0.9% NaCl @ 75 ml/hr. Waffle mattress placed. Sacral dressing changed. Remain on NPO. Blood glucose monitored. VSS. Continues on High flow O2 per NC. Sating 89-93%. Titrated as needed. No SOB, Turned as ordered and as needed. Able to void freely on diapers. No BM this shift yet. Will monitor.

## 2019-02-08 NOTE — ASSESSMENT & PLAN NOTE
ASPIRATION PNEUMONIA    - patient with episodes of coughing with food   - consolidation on left base concerning for aspiration pneumonia  - on clindamycin   -SLP evaluation of safety for swallowing, recommended NPO and Modified Swallow study pending  -Modified Swallow Study scheduled today

## 2019-02-08 NOTE — NURSING
Pt blood glucose 68, 50% dextrose  12.5 gm given. Asymptomatic. Pt competed with 0.9% Nacl this time.

## 2019-02-08 NOTE — PROGRESS NOTES
[facility] at bedside to transfer patient. IVs discontinued with catheter tips intact. Patient disconnected from telemetry monitor. Patient transported off unit.

## 2019-02-08 NOTE — TREATMENT PLAN
Treatment Plan  02/08/2019  1:46 PM    Met with patient and family today.  Hgb stable with no overt signs of bleeding.  Patient is being discharged today and should continue PPI PO BID.  We plan to bring Mr. Blackburn to clinic in about 4 weeks to see how he is doing/discuss repeat EGD.  We thank you for this consultation, we will sign off at this time, please call us with any additional questions.    Refugio Jacome M.D.  Gastroenterology Fellow, PGY-V  Pager: [phone]  [facility]

## 2019-02-08 NOTE — PLAN OF CARE
Problem: Adult Inpatient Plan of Care  Goal: Plan of Care Review   02/08/19 1325   Plan of Care Review   Plan of Care Reviewed With patient     Recommendations     Recommendation/Intervention:   1.Cont to adv diet to bland/ low fiber as tolerated per SLP.   2.Encourage PO intake > 50%.      1.Provide Boost TID if po intake < 50%.   2.RD to follow  Goals: nutrient intake >/= 85% EEN/EPN   Nutrition Goal Status: new  Communication of RD Recs

## 2019-02-08 NOTE — ASSESSMENT & PLAN NOTE
- At presentation, patient with dark blood in his pad and JIMY. Do not know if patient is on NSAIDs , but he does take ASA no other anticoagulants  - S/p 3 u PRBC in ED  - EGD performed day of presentation. Revealed gastritis/duodenitis and [place] Class IIc duodenal ulcer with flat pigmented spot, too large for intervention  - After drop in hemoglobin following EGD, CTA performed, no active bleeding seen therefore embolization not possible  - Hemoglobin has remained stable since CTA  - S/p PPI gtt x 72 hours, now on BID PPI IV  - Advancing diet as tolerated  - CBC BID  - GI following appreciate their recs   If rebleeds will consider repeating CTA

## 2019-02-08 NOTE — PLAN OF CARE
[facility]     Department of Hospital Medicine     [address] [place] [address]     [phone] after hours  [phone] fax       NURSING HOME ORDERS    02/08/2019    Admit to Nursing Home:  Regular Bed                                                      Diagnoses:  Active Hospital Problems    Diagnosis  POA    *GI bleed [K92.2]  Yes    Acute on chronic respiratory failure with hypoxia [J96.21]  Yes    Encephalopathy [G93.40]  Clinically Undetermined    Aspiration pneumonia [J69.0]  Yes    BPH (benign prostatic hyperplasia) [N40.0]  Unknown    Hyperlipidemia [E78.5]  Unknown    COPD (chronic obstructive pulmonary disease) [J44.9]  Unknown      Resolved Hospital Problems   No resolved problems to display.       Patient is homebound due to:  GI bleed    Allergies:Review of patient's allergies indicates:  No Known Allergies    Vitals:       Once weekly      Diet:   Recommend: Pureed diet, NECTAR thick liquids, whole meds 1 at a time buried in puree, chin tuck to chest, double swallows, and strict aspiration precautions.      Acitivities:      - Up in a chair each morning as tolerated   - Ambulate with assistance to bathroom   - Scheduled walks once each shift (every 8 hours)   - May ambulate independently   - May use walker, cane, or self-propelled wheelchair      LABS:  Per facility protocol    Nursing Precautions:     - Aspiration precautions:             - Total assistance with meals            -  Upright 90 degrees befor during and after meals             -  Suction at bedside          - Fall precautions per nursing home protocol   - Seizure precaution per [facility] protocol   - Decubitus precautions:        -  for positioning   - Pressure reducing foam mattress    CONSULTS:     Physical Therapy to evaluate and treat     Occupational Therapy to evaluate and treat      MISCELLANEOUS CARE:               Routine Skin for Bedridden Patients:   Apply moisture barrier cream to all   skin folds and wet areas in perineal area daily and after baths and               all bowel movements.                   Medications: Discontinue all previous medication orders, if any. See new list below.       Medication List      START taking these medications    amoxicillin-clavulanate 875-125mg 875-125 mg per tablet  Commonly known as:  AUGMENTIN  Take 1 tablet by mouth 2 (two) times daily. for 3 days     pantoprazole 40 MG tablet  Commonly known as:  PROTONIX  Take 1 tablet (40 mg total) by mouth 2 (two) times daily before meals.     umeclidinium 62.5 mcg/actuation Dsdv  Commonly known as:  INCRUSE ELLIPTA  Inhale 62.5 mcg into the lungs once daily. Controller        CONTINUE taking these medications    acetaminophen 325 MG tablet  Commonly known as:  TYLENOL     albuterol 1.25 mg/3 mL Nebu  Commonly known as:  ACCUNEB     BREO ELLIPTA 100-25 mcg/dose diskus inhaler  Generic drug:  fluticasone-vilanterol     docusate sodium 100 MG capsule  Commonly known as:  COLACE     erythromycin ophthalmic ointment  Commonly known as:  ROMYCIN     ferrous gluconate 324 MG tablet  Commonly known as:  FERGON     finasteride 5 mg tablet  Commonly known as:  PROSCAR     FLOMAX 0.4 mg Cap  Generic drug:  tamsulosin     furosemide 20 MG tablet  Commonly known as:  LASIX     guaifenesin 100 mg/5 ml 100 mg/5 mL syrup  Commonly known as:  ROBITUSSIN     loratadine 10 mg tablet  Commonly known as:  CLARITIN     potassium chloride SA 20 MEQ tablet  Commonly known as:  K-DUR,KLOR-CON     simvastatin 20 MG tablet  Commonly known as:  ZOCOR     VITAMIN B-12 1000 MCG tablet  Generic drug:  cyanocobalamin        STOP taking these medications    aspirin 81 MG EC tablet  Commonly known as:  ECOTRIN           Where to Get Your Medications      Information about where to get these medications is not yet available    Ask your nurse or doctor about these medications  · amoxicillin-clavulanate 875-125mg  875-125 mg per tablet  · pantoprazole 40 MG tablet  · umeclidinium 62.5 mcg/actuation Dsdv                   _________________________________  Kelsey Liu MD  02/08/2019

## 2019-02-08 NOTE — ASSESSMENT & PLAN NOTE
- At presentation, patient with dark blood in his pad and JIMY. Do not know if patient is on NSAIDs , but he does take ASA no other anticoagulants  - S/p 3 u PRBC in ED  - EGD performed day of presentation. Revealed gastritis/duodenitis and [place] Class IIc duodenal ulcer with flat pigmented spot, too large for intervention  - After drop in hemoglobin following EGD, CTA performed, no active bleeding seen therefore embolization not possible  - Hemoglobin has remained stable since CTA  - S/p PPI gtt x 72 hours, now on BID PPI IV  - Advancing diet as tolerated  - CBC BID  - GI following appreciate their recs   If rebleeds will consider repeating CTA

## 2019-02-08 NOTE — PROGRESS NOTES
[facility] Hospital Medicine  Progress Note    Patient Name: Krish Blackburn  MRN: [id number]  Patient Class: IP- Inpatient   Admission Date: 2/2/2019  Length of Stay: 5 days  Attending Physician: Fifi Adler MD  Primary Care Provider: Shaun Zapata MD    [facility] Hospital Medicine Team: [facility] HOSP MED 4 Kelsey Liu MD    Subjective:     Principal Problem:GI bleed    Interval History: Patient oriented x 3 on exam but stated that he is not sure why he is in the hospital. No report of bloody BMs. Denies chest pain, abdominal pain or light-headedness.    Review of Systems   Constitutional: Negative for chills and fever.   HENT: Positive for trouble swallowing.    Eyes: Negative for visual disturbance.   Respiratory: Negative for cough and shortness of breath.    Gastrointestinal: Negative for abdominal pain, nausea and vomiting.   Genitourinary: Negative for dysuria.   Musculoskeletal: Negative for back pain.   Neurological: Negative for headaches.     Objective:     Vital Signs (Most Recent):  Temp: 97.6 °F (36.4 °C) (02/07/19 0811)  Pulse: 72 (02/07/19 1318)  Resp: 18 (02/07/19 1318)  BP: 136/70 (02/07/19 1146)  SpO2: (!) 89 % (02/07/19 1318) Vital Signs (24h Range):  Temp:  [96.1 °F (35.6 °C)-98.2 °F (36.8 °C)] 97.6 °F (36.4 °C)  Pulse:  [68-84] 72  Resp:  [16-19] 18  SpO2:  [89 %-98 %] 89 %  BP: (100-136)/(55-77) 136/70     Weight: 50.6 kg (111 lb 8.8 oz)  Body mass index is 17.47 kg/m².    Intake/Output Summary (Last 24 hours) at 2/7/2019 1559  Last data filed at 2/6/2019 1600  Gross per 24 hour   Intake 0 ml   Output --   Net 0 ml      Physical Exam   Constitutional: He is oriented to person, place, and time. He appears well-developed and well-nourished.   HENT:   Head: Normocephalic and atraumatic.   Eyes: Conjunctivae are normal. Right eye exhibits no discharge. Left eye exhibits no discharge.   Cardiovascular: Normal rate and regular rhythm. Exam reveals no gallop and no friction rub.   No  murmur heard.  Pulmonary/Chest: Effort normal and breath sounds normal. No stridor. He has no wheezes. He has no rales.   Abdominal: Soft. Bowel sounds are normal. He exhibits no mass. There is no tenderness. There is no rebound.   Neurological: He is alert and oriented to person, place, and time.   He is oriented x 3; Note sure why he is in the hospital   Skin: Skin is warm and dry.       Significant Labs:   CBC:   Recent Labs   Lab 02/06/19  0814 02/06/19  1845 02/07/19  0801   WBC 5.92 6.04 5.43   HGB 9.8* 11.2* 9.6*   HCT 31.2* 33.3* 31.5*   * 143* 144*     CMP:   Recent Labs   Lab 02/05/19  1856 02/06/19  0457 02/07/19  0332    137 140   K 4.0 3.9 3.4*    108 109   CO2 21* 19* 22*    66* 56*   BUN 16 16 16   CREATININE 0.6 0.6 0.6   CALCIUM 8.2* 8.1* 7.8*   PROT  --  4.8* 4.3*   ALBUMIN  --  2.1* 2.0*   BILITOT  --  0.9 0.8   ALKPHOS  --  67 65   AST  --  23 20   ALT  --  13 10   ANIONGAP 9 10 9   EGFRNONAA >60.0 >60.0 >60.0         Assessment/Plan:      * GI bleed      - At presentation, patient with dark blood in his pad and JIMY. Do not know if patient is on NSAIDs , but he does take ASA no other anticoagulants  - S/p 3 u PRBC in ED  - EGD performed day of presentation. Revealed gastritis/duodenitis and [place] Class IIc duodenal ulcer with flat pigmented spot, too large for intervention  - After drop in hemoglobin following EGD, CTA performed, no active bleeding seen therefore embolization not possible  - Hemoglobin has remained stable since CTA  - S/p PPI gtt x 72 hours, now on BID PPI IV  - Advancing diet as tolerated  - CBC BID  - GI following appreciate their recs   If rebleeds will consider repeating CTA     Encephalopathy    Unclear baseline mentation but patient is a nursing home resident.   Oriented but thinking is not clear, gets agitated easily, poor understanding of situation  Dementia workup with TSH, vitamin studies, RPR  Will discuss with family       Acute on chronic  respiratory failure with hypoxia    ASPIRATION PNEUMONIA    - patient with episodes of coughing with food   - consolidation on left base concerning for aspiration pneumonia  - on clindamycin   -SLP evaluation of safety for swallowing, recommended NPO and Modified Swallow study pending  -Modified Swallow Study       COPD (chronic obstructive pulmonary disease)    Likely contributing to respiratory failure  Goal O2 sats >88%  Duonebs while awake         VTE Risk Mitigation (From admission, onward)        Ordered     IP VTE HIGH RISK PATIENT  Once      02/02/19 1342     Reason for No Pharmacological VTE Prophylaxis  Once      02/02/19 1342     Reason for no Mechanical VTE Prophylaxis  Once      02/02/19 0503     Place sequential compression device  Until discontinued      02/02/19 0503              Kelsey Liu MD  Department of Hospital Medicine   [facility]                    02/08/2019                             STAFF PHYSICIAN NOTE                                   Attending Attestation for Rounds with Resident  I have reviewed and concur with the resident's history, physical, assessment, and plan.  I have personally interviewed and examined the patient at bedside and agree with the resident's findings.                                  ________________________________________

## 2019-02-08 NOTE — SUBJECTIVE & OBJECTIVE
Interval History: Resting comfortably on exam without complaints. Oriented x 3, denies bloody BMs. Hgb 8.7 today from 9.8 yesterday. Scheduled for Barium Swallow today.    Review of Systems   Constitutional: Negative for chills and fever.   HENT: Positive for trouble swallowing.    Eyes: Negative for visual disturbance.   Respiratory: Negative for cough and shortness of breath.    Gastrointestinal: Negative for abdominal pain, nausea and vomiting.   Genitourinary: Negative for dysuria.   Musculoskeletal: Negative for back pain.   Neurological: Negative for headaches.     Objective:     Vital Signs (Most Recent):  Temp: 97.5 °F (36.4 °C) (02/08/19 0810)  Pulse: 70 (02/08/19 0810)  Resp: 18 (02/08/19 0810)  BP: (!) 144/80 (02/08/19 0810)  SpO2: (!) 66 % (02/08/19 0829) Vital Signs (24h Range):  Temp:  [96.6 °F (35.9 °C)-97.5 °F (36.4 °C)] 97.5 °F (36.4 °C)  Pulse:  [58-78] 70  Resp:  [15-19] 18  SpO2:  [66 %-98 %] 66 %  BP: ()/(55-80) 144/80     Weight: 50.6 kg (111 lb 8.8 oz)  Body mass index is 17.47 kg/m².    Intake/Output Summary (Last 24 hours) at 2/8/2019 0913  Last data filed at 2/8/2019 0200  Gross per 24 hour   Intake 500 ml   Output --   Net 500 ml      Physical Exam   Constitutional: He is oriented to person, place, and time. He appears well-developed and well-nourished.   HENT:   Head: Normocephalic and atraumatic.   Eyes: Conjunctivae are normal. Right eye exhibits no discharge. Left eye exhibits no discharge.   Cardiovascular: Normal rate and regular rhythm. Exam reveals no gallop and no friction rub.   No murmur heard.  Pulmonary/Chest: Effort normal and breath sounds normal. No stridor. He has no wheezes. He has no rales.   Abdominal: Soft. Bowel sounds are normal. He exhibits no mass. There is no tenderness. There is no rebound.   Neurological: He is alert and oriented to person, place, and time.   Oriented x 3, Conversing appropriately on exam   Skin: Skin is warm and dry.       Significant  Labs:   BMP:   Recent Labs   Lab 02/08/19  0430   GLU 68*      K 3.6      CO2 22*   BUN 13   CREATININE 0.6   CALCIUM 7.7*   MG 1.4*     CBC:   Recent Labs   Lab 02/07/19  0801 02/07/19  1734 02/08/19  0758   WBC 5.43 5.73 4.76  4.97   HGB 9.6* 9.8* 8.7*  8.7*   HCT 31.5* 29.3* 27.7*  28.0*   * 141* 153  143*

## 2019-02-08 NOTE — PROGRESS NOTES
"[facility]  Adult Nutrition  Progress Note    SUMMARY       Recommendations    Recommendation/Intervention:   1.Cont to adv diet to bland/ low fiber as tolerated per SLP.   2.Encourage PO intake > 50%.     1.Provide Boost TID if po intake < 50%.   2.RD to follow  Goals: nutrient intake >/= 85% EEN/EPN   Nutrition Goal Status: new  Communication of RD Recs: other (comment)(POC)    Reason for Assessment    Reason For Assessment: NPO/clear liquids x 5 days  Diagnosis: ( GI bleed )  Relevant Medical History: COPD, GERD,   Interdisciplinary Rounds: did not attend  General Information Comments: JAMIL assess pt today pt w/ radiology  at time of visit. per SLP note pt cleared for Puree diet. pt to d/c back to NH where he is a resident at.   Nutrition Discharge Planning: [facility]    Nutrition Risk Screen    Nutrition Risk Screen: no indicators present    Nutrition/Diet History    Patient Reported Diet/Restrictions/Preferences: general  Spiritual, Cultural Beliefs, Buddhist Practices, Values that Affect Care: no  Food Allergies: NKFA  Factors Affecting Nutritional Intake: NPO    Anthropometrics    Temp: 98.1 °F (36.7 °C)  Height Method: Stated  Height: 5' 7" (170.2 cm)  Height (inches): 67 in  Weight Method: Standard Scale  Weight: 50.6 kg (111 lb 8.8 oz)  Weight (lb): 111.55 lb  Ideal Body Weight (IBW), Male: 148 lb  % Ideal Body Weight, Male (lb): 75.37 lb  BMI (Calculated): 17.5  BMI Grade: 17 - 18.4 protein-energy malnutrition grade I  Usual Body Weight (UBW), k.9 kg  % Usual Body Weight: 101.61  % Weight Change From Usual Weight: 1.4 %       Lab/Procedures/Meds    Pertinent Labs Reviewed: reviewed  Pertinent Labs Comments: Glu-68, Alb-2  Pertinent Medications Reviewed: reviewed  Pertinent Medications Comments: IVF-NS, Mg sulfate, pantoprazole,     Physical Findings/Assessment         Estimated/Assessed Needs    Weight Used For Calorie Calculations: 50.6 kg (111 lb 8.8 oz)  Energy Calorie Requirements " (kcal): 1424 kcal/d  Energy Need Method: Sandoval-St Jeor(x 1.25)  Protein Requirements: 56-66g/d (1.1-1.3g/kg)  Weight Used For Protein Calculations: 50.6 kg (111 lb 8.8 oz)        RDA Method (mL): 1424  CHO Requirement: NA      Nutrition Prescription Ordered    Current Diet Order: NPO    Evaluation of Received Nutrient/Fluid Intake    I/O: +3.1L SA  Energy Calories Required: not meeting needs  Protein Required: not meeting needs  Fluid Required: not meeting needs  Comments: LBM:2/7/19  % Intake of Estimated Energy Needs: 0 - 25 %  % Meal Intake: NPO    Nutrition Risk    Level of Risk/Frequency of Follow-up: high     Assessment and Plan    Nutrition Problem  Inadequate oral intake    Related to (etiology):   NPO w/ no alt means of nutrition     Signs and Symptoms (as evidenced by):   PO intake 0%  Wt loss 11.4 % x 6 mo  Po intake < 50% x 1 wk   Moderate lean muscle mass wasting    Interventions/Recommendations (treatment strategy):    1.Cont to adv diet to bland/ low fiber as tolerated per SLP.   2.Encourage PO intake > 50%.     1.Provide Boost TID if po intake < 50%.   2.RD to follow    Nutrition Diagnosis Status:   New        Monitor and Evaluation    Food and Nutrient Intake: energy intake, food and beverage intake  Food and Nutrient Administration: diet order  Anthropometric Measurements: weight, weight change  Biochemical Data, Medical Tests and Procedures: (all labs)  Nutrition-Focused Physical Findings: overall appearance     Malnutrition Assessment  Malnutrition Type: (JAMIL 2/2 AQUILES)  Energy Intake: severe energy intake(NPO> 5 days, adv to puree today)          Nutrition Follow-Up    RD Follow-up?: Yes

## 2019-02-08 NOTE — PROGRESS NOTES
[facility] Hospital Medicine  Progress Note    Patient Name: Krish Blackburn  MRN: [id number]  Patient Class: IP- Inpatient   Admission Date: 2/2/2019  Length of Stay: 6 days  Attending Physician: Fifi Adler MD  Primary Care Provider: Shaun Zapata MD    [facility] Team: [facility] MED 4 Kelsey Liu MD    Subjective:     Principal Problem:GI bleed      Interval History: Resting comfortably on exam without complaints. Oriented x 3, denies bloody BMs. Hgb 8.7 today from 9.8 yesterday. Scheduled for Barium Swallow today.    Review of Systems   Constitutional: Negative for chills and fever.   HENT: Positive for trouble swallowing.    Eyes: Negative for visual disturbance.   Respiratory: Negative for cough and shortness of breath.    Gastrointestinal: Negative for abdominal pain, nausea and vomiting.   Genitourinary: Negative for dysuria.   Musculoskeletal: Negative for back pain.   Neurological: Negative for headaches.     Objective:     Vital Signs (Most Recent):  Temp: 97.5 °F (36.4 °C) (02/08/19 0810)  Pulse: 70 (02/08/19 0810)  Resp: 18 (02/08/19 0810)  BP: (!) 144/80 (02/08/19 0810)  SpO2: (!) 66 % (02/08/19 0829) Vital Signs (24h Range):  Temp:  [96.6 °F (35.9 °C)-97.5 °F (36.4 °C)] 97.5 °F (36.4 °C)  Pulse:  [58-78] 70  Resp:  [15-19] 18  SpO2:  [66 %-98 %] 66 %  BP: ()/(55-80) 144/80     Weight: 50.6 kg (111 lb 8.8 oz)  Body mass index is 17.47 kg/m².    Intake/Output Summary (Last 24 hours) at 2/8/2019 0913  Last data filed at 2/8/2019 0200  Gross per 24 hour   Intake 500 ml   Output --   Net 500 ml      Physical Exam   Constitutional: He is oriented to person, place, and time. He appears well-developed and well-nourished.   HENT:   Head: Normocephalic and atraumatic.   Eyes: Conjunctivae are normal. Right eye exhibits no discharge. Left eye exhibits no discharge.   Cardiovascular: Normal rate and regular rhythm. Exam reveals no gallop and no friction rub.   No murmur  heard.  Pulmonary/Chest: Effort normal and breath sounds normal. No stridor. He has no wheezes. He has no rales.   Abdominal: Soft. Bowel sounds are normal. He exhibits no mass. There is no tenderness. There is no rebound.   Neurological: He is alert and oriented to person, place, and time.   Oriented x 3, Conversing appropriately on exam   Skin: Skin is warm and dry.       Significant Labs:   BMP:   Recent Labs   Lab 02/08/19  0430   GLU 68*      K 3.6      CO2 22*   BUN 13   CREATININE 0.6   CALCIUM 7.7*   MG 1.4*     CBC:   Recent Labs   Lab 02/07/19  0801 02/07/19  1734 02/08/19  0758   WBC 5.43 5.73 4.76  4.97   HGB 9.6* 9.8* 8.7*  8.7*   HCT 31.5* 29.3* 27.7*  28.0*   * 141* 153  143*         Assessment/Plan:      * GI bleed    - At presentation, patient with dark blood in his pad and JIMY. Do not know if patient is on NSAIDs , but he does take ASA no other anticoagulants  - S/p 3 u PRBC in ED  - EGD performed day of presentation. Revealed gastritis/duodenitis and Gowen Class IIc duodenal ulcer with flat pigmented spot, too large for intervention  - After drop in hemoglobin following EGD, CTA performed, no active bleeding seen therefore embolization not possible  - Hemoglobin has remained stable since CTA  - S/p PPI gtt x 72 hours, now on BID PPI IV  - Advancing diet as tolerated  - CBC BID  - GI following appreciate their recs   If rebleeds will consider repeating CTA     Encephalopathy    Unclear baseline mentation but patient is a nursing home resident.   Oriented but thinking is not clear, gets agitated easily, poor understanding of situation  Dementia workup with TSH, vitamin studies, RPR  Will discuss with family       Acute on chronic respiratory failure with hypoxia    ASPIRATION PNEUMONIA    - patient with episodes of coughing with food   - consolidation on left base concerning for aspiration pneumonia  - on clindamycin   -SLP evaluation of safety for swallowing, recommended NPO  and Modified Swallow study pending  -Modified Swallow Study scheduled today       Aspiration pneumonia    See hypoxic respiratory failure       COPD (chronic obstructive pulmonary disease)    Likely contributing to respiratory failure  Goal O2 sats >88%  Duonebs while awake         VTE Risk Mitigation (From admission, onward)        Ordered     IP VTE HIGH RISK PATIENT  Once      02/02/19 1342     Reason for No Pharmacological VTE Prophylaxis  Once      02/02/19 1342     Reason for no Mechanical VTE Prophylaxis  Once      02/02/19 0503     Place sequential compression device  Until discontinued      02/02/19 0503              Kelsey Liu MD  Department of Hospital Medicine   [facility]                    02/08/2019                             STAFF PHYSICIAN NOTE                                   Attending Attestation for Rounds with Resident  I have reviewed and concur with the resident's history, physical, assessment, and plan.  I have personally interviewed and examined the patient at bedside and agree with the resident's findings.                                  ________________________________________                                     REASON FOR ADMISSION:     Patient is 87 y.o.male    Body mass index is 17.47 kg/m².,  GI bleed

## 2019-02-08 NOTE — PROCEDURES
Modified Barium Swallow    Patient Name:  Krish Blackburn   MRN:  [id number]  642/642 A    Recommendations:     Recommendations:                General Recommendations: trial Dysphagia therapy  Diet recommendations:  Puree, [address] Thick whole medications buried in puree  Aspiration Precautions: STRICT  · 1 SMALL bite/sip at a time,   · Chin tuck to chest with all po intake  · Double swallows  · Assistance with thickening liquids,   · Frequent oral care,   · HOB to 90 degrees and remain upright 30 minutes s/p intake  · No straws   · Continue to monitor for signs and symptoms of aspiration and discontinue oral feeding should you notice any of the following: watery eyes, reddened facial area, wet vocal quality, increased work of breathing, change in respiratory status, increased congestion, coughing, fever, etc.  General Precautions: Standard, aspiration, respiratory, pureed diet, nectar thick, fall  Communication strategies:  none    Referral     Reason for Referral  Patient was referred for a Modified Barium Swallow Study to assess the efficiency of his/her swallow function, rule out aspiration and make recommendations regarding safe dietary consistencies, effective compensatory strategies, and safe eating environment.     Diagnosis: GI bleed       History:     Past Medical History:   Diagnosis Date    Anemia     Arthritis     BPH (benign prostatic hyperplasia)     Cataract     COPD (chronic obstructive pulmonary disease)     Dysphagia     Emphysema lung     GERD (gastroesophageal reflux disease)     Heart failure     Hyperlipidemia     Hypertension     Hypokalemia     MI (myocardial infarction)     Osteoarthritis     Stroke        Objective:     Current Respiratory Status: 02/08/19    Alert: yes    Cooperative: yes    Follows Directions: yes- hearing impaired    Visualization  · Patient was seen in the lateral view    Oral Peripheral Examination  · Oral Musculature: WFL  · Dentition: edentulous  · Oral  Labial Strength and Mobility: WFL  · Lingual Strength and Mobility: WFL  · Volitional Cough: adequate  · Volitional Swallow: adequate  · Voice Prior to PO Intake: clear    Consistencies Assessed  · Thin 5 ml  · Nectar thick 5 ml x2, cup sips x4  · Honey thick tsp sip x2  · Puree tsp bite x2  · Solids bite of vinicius cracker with pudding barium paste x1   · Barium tablet buried in apple sauce    Oral Preparation/Oral Phase  With vinicius cracker trial:   · prolonged A-P transfer  · prolonged mastication  · Oral residue present post swallow  · Decreased base of tongue mobility  · Premature spillage to the vallecula and pooling into the piriform sinuses of barium paste while patient was attempting to chew cracker. Patient spit out vinicius cracker and swallowed barium paste given cue from SLP.   Decreased BOT retraction with all trials     Pharyngeal Phase   Patient presents with moderate pharyngeal dysphagia c/b decreased BOT retraction and suspected decreased hyolaryngeal elevation/excursion resulting in nanette aspiration of thin liquids with immediate coughing and choking which did not clear trachea or laryngeal vestibule of aspirated material and moderate-severe vallecular and pharyngeal wall residue adequately cleared with cued second swallow. Chin tuck and double swallows with nectar thick and puree trials effective in reducing pharyngeal residue. Scattered residue noted in piriform sinuses and in the laryngeal vestibule s/p thin liquid trial throughout MBSS. Residue in laryngeal vestibule not suspected to be penetrated material from further consistency trials. Patient did not have difficulties swallowing barium tablet when buried in apple sauce. Patient's swallow may be negatively affected by possible osteophytes on cervical spine.     Cervical Esophageal Phase  · UES appeared to accommodate all bolus types without stasis or retrograde movement observed     Assessment:     Impressions  · Patient presents with moderate  oropharyngeal dysphagia c/b lack of dentition to adequately masticate solid trials, decreased BOT retraction and suspected decreased hyolaryngeal elevation/excursion resulting in nanette aspiration of thin liquids with immediate coughing and choking which did not clear trachea or laryngeal vestibule of aspirated material and moderate-severe vallecular and pharyngeal wall residue adequately cleared with cued second swallow.    Prognosis: Guarded    Barriers:  · Fatigue  · swallowing possibly negatively impacted by cervical spine anatmoy    Plan  ST will continue to follow to provide patient education, assess tolerance of diet, ensure compliance with safe swallowing precautions, and initiate trial dysphagia therapy.     Education  SLP communicated MBSS results and SLP recommendations with MD.     SLP returned to patient's room. Patient education provided on MBSS results, SLP recommendations, safe swallowing precautions, importance of thickener, SLP role, s/s and risks of aspiraiton, and POC. Patient verbalized understanding of all discussed. SLP provided patient with a handout listing safe swallowing precautions and an extra thickener packet.     Goals:   Multidisciplinary Problems     SLP Goals        Problem: SLP Goal    Goal Priority Disciplines Outcome   SLP Goal     SLP Ongoing (interventions implemented as appropriate)   Description:  Speech Therapy Short Term Goals  Goal expected to be met by 2/22  1. Patient will tolerate a pureed diet and NECTAR thick liquids with a CHIN TUCK and no overt s/s of aspiration.  2. Pt will tolerate soft solid trials with SLP for possible diet upgrade pending presence of dentures, timely mastication, adequate oral clearance, and no overt s/s of aspiration.   3. Pt will participate in trial dysphagia therapy exercises x10 with SLP given min cues.    Speech Language Pathology Goals  Goals expected to be met by 2/13  1. Pt will participate in Modified Barium Swallow Study to  objectively assess swallow & determine safety of PO intake/least restrictive PO consistencies -MET                     Plan:   · Patient to be seen:  Therapy Frequency: 4 x/week   · Plan of Care expires:  03/07/19  · Plan of Care reviewed with:  patient        Discharge recommendations:  (return to NH)   Barriers to Discharge:  None    Time Tracking:   SLP Treatment Date:   02/08/19  Speech Start Time:  1010  Speech Stop Time:  1030     Speech Total Time (min):  20 min     Education: [id number]    CHRISSY Gurrola, CCC-SLP   Pager: [phone]  02/08/2019

## 2019-02-08 NOTE — PLAN OF CARE
Problem: SLP Goal  Goal: SLP Goal  Speech Therapy Short Term Goals  Goal expected to be met by 2/22  1. Patient will tolerate a pureed diet and NECTAR thick liquids with a CHIN TUCK and no overt s/s of aspiration.  2. Pt will tolerate soft solid trials with SLP for possible diet upgrade pending presence of dentures, timely mastication, adequate oral clearance, and no overt s/s of aspiration.   3. Pt will participate in trial dysphagia therapy exercises x10 with SLP given min cues.    Speech Language Pathology Goals  Goals expected to be met by 2/13  1. Pt will participate in Modified Barium Swallow Study to objectively assess swallow & determine safety of PO intake/least restrictive PO consistencies -MET   Outcome: Ongoing (interventions implemented as appropriate)  Bedside swallow study completed. Patient presents with nanette aspiration of thin liquids. Recommend: Pureed diet, NECTAR thick liquids, whole meds 1 at a time buried in puree, chin tuck to chest, double swallows, and strict aspiration precautions. ST will continue to follow.   SCOTTIE Cleaning., CCC-SLP  Pager: [phone]  02/08/2019

## 2019-02-08 NOTE — PLAN OF CARE
Sw did receive ok to send Pt back to NH, nurse to call report to [facility] at , room is 231b. Stretcher to arrive at 430pm and Sw is waiting on N to call Sw with approved stretcher auth. Auth number is [id number], nurse Penelope early.

## 2019-02-08 NOTE — PLAN OF CARE
02/08/19 1252   Post-Acute Status   Post-Acute Authorization Placement   Post-Acute Placement Status Referrals Sent     Sw sent NH referral to [facility] where Pt was residing. Sw to follow with transport.

## 2019-02-09 LAB
BLD PROD TYP BPU: NORMAL
BLOOD UNIT EXPIRATION DATE: NORMAL
BLOOD UNIT TYPE CODE: 5100
BLOOD UNIT TYPE: NORMAL
CODING SYSTEM: NORMAL
DISPENSE STATUS: NORMAL
TRANS ERYTHROCYTES VOL PATIENT: NORMAL ML

## 2019-02-09 NOTE — SUBJECTIVE & OBJECTIVE
Review of Systems   Constitutional: Negative for chills and fever.   HENT: Positive for trouble swallowing.    Eyes: Negative for visual disturbance.   Respiratory: Negative for cough and shortness of breath.    Gastrointestinal: Negative for abdominal pain, nausea and vomiting.   Genitourinary: Negative for dysuria.   Musculoskeletal: Negative for back pain.   Neurological: Negative for headaches.     Objective:     Vital Signs (Most Recent):  Temp: 98.1 °F (36.7 °C) (02/08/19 1205)  Pulse: 84 (02/08/19 1534)  Resp: 18 (02/08/19 1534)  BP: 111/69 (02/08/19 1534)  SpO2: (!) 90 % (02/08/19 1534) Vital Signs (24h Range):  Temp:  [96.6 °F (35.9 °C)-98.1 °F (36.7 °C)] 98.1 °F (36.7 °C)  Pulse:  [58-84] 84  Resp:  [15-18] 18  SpO2:  [89 %-98 %] 90 %  BP: ()/(53-80) 111/69     Weight: 50.6 kg (111 lb 8.8 oz)  Body mass index is 17.47 kg/m².    Intake/Output Summary (Last 24 hours) at 2/8/2019 2029  Last data filed at 2/8/2019 0200  Gross per 24 hour   Intake 500 ml   Output --   Net 500 ml      Physical Exam   Constitutional: He is oriented to person, place, and time. He appears well-developed and well-nourished.   HENT:   Head: Normocephalic and atraumatic.   Eyes: Conjunctivae are normal. Right eye exhibits no discharge. Left eye exhibits no discharge.   Cardiovascular: Normal rate and regular rhythm. Exam reveals no gallop and no friction rub.   No murmur heard.  Pulmonary/Chest: Effort normal and breath sounds normal. No stridor. He has no wheezes. He has no rales.   Abdominal: Soft. Bowel sounds are normal. He exhibits no mass. There is no tenderness. There is no rebound.   Neurological: He is alert and oriented to person, place, and time.   Oriented x 3, Conversing appropriately on exam   Skin: Skin is warm and dry.       Significant Labs:   CBC:   Recent Labs   Lab 02/07/19  0801 02/07/19  1734 02/08/19  0758   WBC 5.43 5.73 4.76  4.97   HGB 9.6* 9.8* 8.7*  8.7*   HCT 31.5* 29.3* 27.7*  28.0*   PLT  144* 141* 153  143*     CMP:   Recent Labs   Lab 02/07/19  0332 02/08/19  0430    140   K 3.4* 3.6    109   CO2 22* 22*   GLU 56* 68*   BUN 16 13   CREATININE 0.6 0.6   CALCIUM 7.8* 7.7*   PROT 4.3* 4.5*   ALBUMIN 2.0* 2.0*   BILITOT 0.8 0.9   ALKPHOS 65 66   AST 20 23   ALT 10 13   ANIONGAP 9 9   EGFRNONAA >60.0 >60.0

## 2019-02-09 NOTE — DISCHARGE SUMMARY
"[facility] Hospital Medicine  Discharge Summary      Patient Name: Krish Blackburn  MRN: [id number]  Admission Date: 2/2/2019  Hospital Length of Stay: 6 days  Discharge Date and Time:  02/08/2019 8:31 PM  Attending Physician: No att. providers found   Discharging Provider: Kelsey Liu MD  Primary Care Provider: Shaun Zapata MD  [facility] Medicine Team: [facility] HOSP MED 4 Kelsey Liu MD      HPI:  Mr. Krish Blackburn is a 87 y.o. with a PMH of HLD, COPD ( on home oxygen) who presented from NH for GI bleed. When asking the patient he kept saying " I do not know why I am here." In the ED patient found to have a hgb of 5.8, INR of 1.7, and BUN of 45. He was hypotensive with SBPs in the 70 . He denied any SOB, lightheadedness, palpitation or chest pain. When examined he had melena on the padding. He was oriented to self and place.      In the ED he was given 3 units of blood and MICU was consulted for admission. GI was consulted in the ED      Can not determine if/when patient had a colonoscopy or endoscopy      Hospital/ICU Course:  Patient admitted to MICU for close monitoring. EGD performed the day of presentation showed Grade D reflux esophagitis, duodenitis, and a large duodenal ulcer which was not amenable to intervention. He was stabilized in the ICU and stepped down to medicine. Due to concern for aspiration due to episodes of "choking" noted by the family, he was started on clindamycin. Swallow study was performed and patient was approved for dysphagic diet with nectar thick liquids. He was discharged in stable condition back to [facility] [place] with GI follow up.      Review of Systems   Constitutional: Negative for chills and fever.   HENT: Positive for trouble swallowing.    Eyes: Negative for visual disturbance.   Respiratory: Negative for cough and shortness of breath.    Gastrointestinal: Negative for abdominal pain, nausea and vomiting.   Genitourinary: Negative for dysuria. "   Musculoskeletal: Negative for back pain.   Neurological: Negative for headaches.     Objective:     Vital Signs (Most Recent):  Temp: 98.1 °F (36.7 °C) (02/08/19 1205)  Pulse: 84 (02/08/19 1534)  Resp: 18 (02/08/19 1534)  BP: 111/69 (02/08/19 1534)  SpO2: (!) 90 % (02/08/19 1534) Vital Signs (24h Range):  Temp:  [96.6 °F (35.9 °C)-98.1 °F (36.7 °C)] 98.1 °F (36.7 °C)  Pulse:  [58-84] 84  Resp:  [15-18] 18  SpO2:  [89 %-98 %] 90 %  BP: ()/(53-80) 111/69     Weight: 50.6 kg (111 lb 8.8 oz)  Body mass index is 17.47 kg/m².    Intake/Output Summary (Last 24 hours) at 2/8/2019 2029  Last data filed at 2/8/2019 0200  Gross per 24 hour   Intake 500 ml   Output --   Net 500 ml      Physical Exam   Constitutional: He is oriented to person, place, and time. He appears well-developed and well-nourished.   HENT:   Head: Normocephalic and atraumatic.   Eyes: Conjunctivae are normal. Right eye exhibits no discharge. Left eye exhibits no discharge.   Cardiovascular: Normal rate and regular rhythm. Exam reveals no gallop and no friction rub.   No murmur heard.  Pulmonary/Chest: Effort normal and breath sounds normal. No stridor. He has no wheezes. He has no rales.   Abdominal: Soft. Bowel sounds are normal. He exhibits no mass. There is no tenderness. There is no rebound.   Neurological: He is alert and oriented to person, place, and time.   Oriented x 3, Conversing appropriately on exam   Skin: Skin is warm and dry.       Significant Labs:   CBC:   Recent Labs   Lab 02/07/19  0801 02/07/19  1734 02/08/19  0758   WBC 5.43 5.73 4.76  4.97   HGB 9.6* 9.8* 8.7*  8.7*   HCT 31.5* 29.3* 27.7*  28.0*   * 141* 153  143*     CMP:   Recent Labs   Lab 02/07/19  0332 02/08/19  0430    140   K 3.4* 3.6    109   CO2 22* 22*   GLU 56* 68*   BUN 16 13   CREATININE 0.6 0.6   CALCIUM 7.8* 7.7*   PROT 4.3* 4.5*   ALBUMIN 2.0* 2.0*   BILITOT 0.8 0.9   ALKPHOS 65 66   AST 20 23   ALT 10 13   ANIONGAP 9 9   EGFRNONAA  >60.0 >60.0           Assessment/Plan:      * GI bleed    - At presentation, patient with dark blood in his pad and JIMY. Do not know if patient is on NSAIDs , but he does take ASA no other anticoagulants  - S/p 3 u PRBC in ED  - EGD performed day of presentation. Revealed gastritis/duodenitis and Bulloch Class IIc duodenal ulcer with flat pigmented spot, too large for intervention  - After drop in hemoglobin following EGD, CTA performed, no active bleeding seen therefore embolization not possible  - Hemoglobin has remained stable since CTA  - S/p PPI gtt x 72 hours, now on BID PPI IV  - Advancing diet as tolerated  - CBC BID  - GI following appreciate their recs   If rebleeds will consider repeating CTA     Encephalopathy    Unclear baseline mentation but patient is a nursing home resident.   Oriented but thinking is not clear, gets agitated easily, poor understanding of situation  Dementia workup with TSH, vitamin studies, RPR  Will discuss with family       Acute on chronic respiratory failure with hypoxia    ASPIRATION PNEUMONIA    - patient with episodes of coughing with food   - consolidation on left base concerning for aspiration pneumonia  - on clindamycin   -SLP evaluation of safety for swallowing, recommended NPO and Modified Swallow study pending  -Modified Swallow Study scheduled today       Aspiration pneumonia    See hypoxic respiratory failure       COPD (chronic obstructive pulmonary disease)    Likely contributing to respiratory failure  Goal O2 sats >88%  Duonebs while awake           Consults:   Consults (From admission, onward)        Status Ordering Provider     Inpatient consult to Critical Care Medicine  Once     Provider:  (Not yet assigned)    Completed MICHAELA CABRERA     Inpatient consult to Gastroenterology  Once     Provider:  (Not yet assigned)    Completed MICHAELA CABRERA     Inpatient consult to Interventional Radiology  Once     Provider:  (Not yet assigned)    Completed  NANNETTE HAJI     Inpatient consult to Midline team  Once     Provider:  (Not yet assigned)    Completed NANNETTE HAJI          No new Assessment & Plan notes have been filed under this hospital service since the last note was generated.  Service: Hospital Medicine    Final Active Diagnoses:    Diagnosis Date Noted POA    PRINCIPAL PROBLEM:  GI bleed [K92.2] 02/02/2019 Yes    Acute on chronic respiratory failure with hypoxia [J96.21] 02/06/2019 Yes    Encephalopathy [G93.40] 02/06/2019 Clinically Undetermined    Aspiration pneumonia [J69.0] 02/05/2019 Yes    BPH (benign prostatic hyperplasia) [N40.0] 02/03/2019 Unknown    Hyperlipidemia [E78.5] 02/03/2019 Unknown    COPD (chronic obstructive pulmonary disease) [J44.9] 02/02/2019 Unknown      Problems Resolved During this Admission:       Discharged Condition: stable    Disposition: Another Health Care Inst*    Follow Up:  Follow-up Information     Shaun Zapata MD In 1 week.    Specialty:  Internal Medicine  Contact information:  [address] [address] [phone]             [facility] - Gastroenterology In 1 month.    Specialty:  Gastroenterology  Contact information:  [address] [facility] [address] [phone]  Additional information:  [facility] - 4th Floor           [facility].    Specialties:  Nursing Home Agency, SNF Agency  Contact information:  [address] [facility] [address] [phone]                 Patient Instructions:   No discharge procedures on file.    Significant Diagnostic Studies:     Pending Diagnostic Studies:     Procedure Component Value Units Date/Time    CBC auto differential [[id number]] Collected:  02/07/19 0801    Order Status:  Sent Lab Status:  In process Updated:  02/07/19 0801    Specimen:  Blood     CBC auto differential [[id number]] Collected:  02/06/19 1154    Order Status:  Sent Lab Status:  In process Updated:  02/06/19 1154     Specimen:  Blood     Vitamin B1 [[id number]] Collected:  02/07/19 0332    Order Status:  Sent Lab Status:  In process Updated:  02/07/19 0517    Specimen:  Blood          Medications:  Reconciled Home Medications:      Medication List      START taking these medications    amoxicillin-clavulanate 875-125mg 875-125 mg per tablet  Commonly known as:  AUGMENTIN  Take 1 tablet by mouth 2 (two) times daily. for 3 days     pantoprazole 40 MG tablet  Commonly known as:  PROTONIX  Take 1 tablet (40 mg total) by mouth 2 (two) times daily before meals.     umeclidinium 62.5 mcg/actuation Dsdv  Commonly known as:  INCRUSE ELLIPTA  Inhale 62.5 mcg into the lungs once daily. Controller        CONTINUE taking these medications    acetaminophen 325 MG tablet  Commonly known as:  TYLENOL  Take 650 mg by mouth every 4 (four) hours as needed for Pain (headache).     albuterol 1.25 mg/3 mL Nebu  Commonly known as:  ACCUNEB  Take 1.25 mg by nebulization every 6 (six) hours as needed. Rescue     BREO ELLIPTA 100-25 mcg/dose diskus inhaler  Generic drug:  fluticasone-vilanterol  Inhale 1 puff into the lungs once daily. Controller     docusate sodium 100 MG capsule  Commonly known as:  COLACE  Take 100 mg by mouth 2 (two) times daily.     erythromycin ophthalmic ointment  Commonly known as:  ROMYCIN  Place 1 inch into both eyes every evening.     ferrous gluconate 324 MG tablet  Commonly known as:  FERGON  Take 324 mg by mouth daily with breakfast.     finasteride 5 mg tablet  Commonly known as:  PROSCAR  Take 5 mg by mouth once daily.     FLOMAX 0.4 mg Cap  Generic drug:  tamsulosin  Take 0.4 mg by mouth once daily.     furosemide 20 MG tablet  Commonly known as:  LASIX  Take 10 mg by mouth once daily.     guaifenesin 100 mg/5 ml 100 mg/5 mL syrup  Commonly known as:  ROBITUSSIN  Take 10 mLs by mouth 4 (four) times daily as needed for Cough.     loratadine 10 mg tablet  Commonly known as:  CLARITIN  Take 10 mg by mouth once daily.      potassium chloride SA 20 MEQ tablet  Commonly known as:  K-DUR,KLOR-CON  Take 40 mEq by mouth once.     simvastatin 20 MG tablet  Commonly known as:  ZOCOR  Take 20 mg by mouth every evening.     VITAMIN B-12 1000 MCG tablet  Generic drug:  cyanocobalamin  Take 1,000 mcg by mouth once daily.        STOP taking these medications    aspirin 81 MG EC tablet  Commonly known as:  ECOTRIN            Indwelling Lines/Drains at time of discharge:   Lines/Drains/Airways          None          Time spent on the discharge of patient: 35 minutes  Patient was seen and examined on the date of discharge and determined to be suitable for discharge.         Kelsey Liu MD  Department of Hospital Medicine  [facility]

## 2019-02-11 ENCOUNTER — TELEPHONE (OUTPATIENT)
Dept: GASTROENTEROLOGY | Facility: CLINIC | Age: 84
End: 2019-02-11

## 2019-02-11 ENCOUNTER — TELEPHONE (OUTPATIENT)
Dept: INTERNAL MEDICINE | Facility: CLINIC | Age: 84
End: 2019-02-11

## 2019-02-11 LAB — VIT B1 BLD-MCNC: 23 UG/L (ref 38–122)

## 2019-02-11 NOTE — TELEPHONE ENCOUNTER
Spoke with patient's nurse at [place]. Patient is scheduled to see Dr. Jacome on 4/10 at 4:30.

## 2019-02-11 NOTE — TELEPHONE ENCOUNTER
----- Message from Refugio Jacome MD sent at 2/8/2019  6:16 PM CST -----  Hi :)    Can we bring this christopher back to clinic in ~ 4 weeks (on a day I have clinic with Raphael)?    Let me know  Dr. Jacome

## 2019-02-11 NOTE — TELEPHONE ENCOUNTER
----- Message from Refugio Jacome MD sent at 2/11/2019 10:06 AM CST -----  Ok that fine but him down for the one available in 7 weeks  ----- Message -----  From: Shanell Heaton MA  Sent: 2/11/2019   8:34 AM  To: Refugio Jacome MD    You do not have anything available with Dr. Lim in 4 weeks. The next available is April 3 (7 wks) with Dr. Lim.     You have available appointments with Dr. Medina and Dr. Conrad in 4 weeks.   ----- Message -----  From: Refugio Jacome MD  Sent: 2/8/2019   6:16 PM  To: Shanell Heaton MA    Hi :)    Can we bring this christopher back to clinic in ~ 4 weeks (on a day I have clinic with Rahpael)?    Let me know  Dr. Jacome

## 2019-02-11 NOTE — TELEPHONE ENCOUNTER
Thiamine level drawn while inpatient returned low. Called nursing home and prescribed supplementation. Thiamine 100mg PO TID x 2 weeks, then 100mg PO daily.     Alexandr Lester MD   Internal Medicine PGY-2  [phone]

## 2019-02-11 NOTE — PLAN OF CARE
02/11/19 1146   Final Note   Assessment Type Final Discharge Note   Anticipated Discharge Disposition [facility]   Right Care Referral Info   Facility Name [facility] [place]

## 2019-02-12 NOTE — PHYSICIAN QUERY
PT Name: Krish Blackburn  MR #: [id number]  Tiki Chaparro RN, CCDS  Desk # [phone]; [facility] # [phone] [email or web address]    This form is a permanent document in the medical record.     Query Date: February 12, 2019    Physician Query - Neurological Condition Clarification    By submitting this query, we are merely seeking further clarification of documentation to reflect the severity of illness of your patient. Please utilize your independent clinical judgment when addressing the question(s) below.    The Medical record reflects the following:     Indicators   Supporting Clinical Findings Location in Medical Record   x AMS, Confusion,  LOC, etc.  agitation and confusion HOsp PN 2/6   x Acute / Chronic Illness GI Bleed  Reflux esophagitis  Gastritis  Duodenitits  Duodenal ulcer  Duodenal stenoisis  Duodenal mucosal atrophy  ABLA  Hypovolemia hypotension  Shock  Copd - Likely contributing to respiratory failure  Dementia  Encephalopathy  Hypoxia  Aspiration pneumonia EGD Report                    Cc md pn 2/4     Ed md  HOsp PN 2/6      ED MD  CC Med  PN 2/5    Radiology Findings     x Electrolyte Imbalance Mag 1.6 replaced with 2G IVPB ICU RN 2/5    Medication      Treatment             x Other Encephalopathy  Unclear baseline mentation but patient is a nursing home resident.   Oriented but thinking is not clear, gets agitated easily, poor understanding of situation  Dementia workup with TSH, vitamin studies, RPR  Will discuss with family HOsp PN 2/6     Encephalopathy- is a general term for any diffuse disease of the brain that alters brain function or structure. Treatment of the cognitive dysfunction varies but is ultimately dependent on the treatment of the underlying condition.    Major Symptoms of Encephalopathy - Decreased level of consciousness, fluctuating alertness/concentration, confusion, agitation, lethargy, somnolence, drowsiness, obtundation, stupor, or coma.         References: National  Institutes of Healths (NIH) National [place] of Neurological Disorders and Strokes;  HCPro 2016; Advisory Board     Clinical Guidelines:   These guidelines will set system standards to assist providers in managing, documentation, and coding of encephalopathy. The intent of this document is to serve as a system guideline, not replace the providers clinical judgment:    Provider, please specify the diagnosis or diagnoses associated with above clinical findings.    [ x  ] Metabolic Encephalopathy - Due to electrolye imbalance, metabolic derangements, or infections processes, includes Septic Encephalopathy   [   ] Other Encephalopathy - Includes uremic encephalopathy   [   ] Unspecified Encephalopathy      [   ] Other Neurological Condition-  Includes Post-ictal altered mental status. (please specify condition): _________   [   ]  Clinically Undetermined     Please document in your progress notes daily for the duration of treatment until resolved, and include in your discharge summary.

## 2019-02-12 NOTE — PHYSICIAN QUERY
PT Name: Krish Blackburn  MR #: [id number]     Physician Query Form - Documentation Clarification      Tiki Chaparro RN, CCDS  Desk # [phone]; [facility] # [phone] [email or web address]      This form is a permanent document in the medical record.     Query Date: February 12, 2019    By submitting this query, we are merely seeking further clarification of documentation. Please utilize your independent clinical judgment when addressing the question(s) below.    The Medical record reflects the following:    Supporting Clinical Findings Location in Medical Record   Shock  Hypoxia  SBP: 70  + Guaiac stool   GI bleed.  Vitals with hypotension  severe anemia.  Improved with 1 L normal saline  Total of 3 units of blood were eventually transfused.   Protonix IV.   ED MD   1. Acute GI Bleed  2. Acute blood loss anemia  3. Hypovolemic hypotension  Transfused 3units and hgb 11   H&P   ICU team reports abrupt drop in hemoglobin and blood pressure requiring pressors.   - CTA negative for active GI bleed and therefore target for angiogram    - If evidence of re-bleed, recommend repeating CTA  IR CN 2/3   EGD showed ulcer, gastritis  Repeat episode of bleeding and cta negative CC Med PN 2/3    Hgb stable with no overt signs of bleeding.  Patient is being discharged today and should continue PPI PO BID.  We plan to bring Mr. Blackburn to clinic in about 4 weeks to see how he is doing/discuss repeat EGD. GI Trx 2/8   Rr: 16 - 18  HR: 55 - 84  BP: 78/45 - 99/54; 104/57 - 113/56    BP: 70/43 in Ed   VS Flow Sheet 2/2-2/8   IVF:  NS bolus 500 ml 2/5  NS 1 Liter bolus on 2/2   ml bolus on 2/3   ml bolus on 2/3  NS @ 75 ml/hr 2/6-8     Norepinephrine continuous #@ 3.2 ml/hr 2/2-2/4 MAR                                                                          Doctor, Please specify diagnosis or diagnoses associated with above clinical findings.    Provider Use Only    ( x ) Agree with Shock - specify type:  _____hypovolemic__________;                                       specify Present on admit: (  )yes;  (  )no;  (  )undeterminable    (x  ) Hypovolemic Shock - Present on admit: yes    (  )  Hypovolemic Shock - not present on admit    (  ) Other shock - unknown type of shock                            - specify present on admit status: (  )yes;  (  )no;  (  )undeterminable    (  ) Disagree with Shock diagnosis     (  ) Other diagnosis - specify: ___________________                                                                                                               [  ] Clinically Undetermined

## 2019-02-12 NOTE — PHYSICIAN QUERY
PT Name: Krish Blackburn  MR #: [id number]     PHYSICIAN QUERY -  ELECTROLYTE CLARIFICATION      Tiki Chaparro RN, CCDS  Desk # [phone]; [facility] # [phone] [email or web address]    This form is a permanent document in the medical record.     Query Date: February 12, 2019    By submitting this query, we are merely seeking further clarification of documentation to reflect the severity of illness of your patient. Please utilize your independent clinical judgment when addressing the question(s) below.    The Medical record reflects the following:     Indicators   Supporting Clinical Findings Location in Medical Record   x Lab Value(s) Mg 1.4 on 2/8  K 3.2 on 2/5  Phos 2.4 on 2/4   Lab   x Treatment                                 Medication KPhos 15 mmol IVPB once 2/7  KPhos 15 mmol IVPB once 2/4  KCL 10 meq IV x2 doses on 2/5 & 2/7 x1 dose  Mg sulfate IVPB 2g IVPB once on  2/5, 2/8, 2/4  Mg Oxide Tab 400 mg once 2/4   Mar    Other       Provider, please specify the diagnosis or diagnoses that correspond(s) to the above indicators. Brian all that apply:    [  x ] Hypokalemia   [x   ] Hypomagnesemia   [  x ] Hypophosphatemia   [   ] Other electrolyte disturbance (please specify): _______   [   ]  Clinically Undetermined       Please document in your progress notes daily for the duration of treatment until resolved, and include in your discharge summary.

## 2019-02-12 NOTE — PHYSICIAN QUERY
"PT Name: Krish Blackburn  MR #: [id number]    Physician Query Form - Nutrition Clarification     Tiki Chaparro RN, CCDS  Desk # [phone]; [facility] # [phone] [email or web address]      This form is a permanent document in the medical record.     Query Date: 2019    By submitting this query, we are merely seeking further clarification of documentation.. Please utilize your independent clinical judgment when addressing the question(s) below.    The Medical record contains the following:   Indicators  Supporting Clinical Findings Location in Medical Record   x % of Estimated Energy Intake over a time frame from p.o., TF, or TPN I/O: +3.1L SA  Energy Calories Required: not meeting needs  Protein Required: not meeting needs  Fluid Required: not meeting needs  Comments: LBM:/  % Intake of Estimated Energy Needs: 0 - 25 %  % Meal Intake: NPO    Signs and Symptoms (as evidenced by):   PO intake 0%  Wt loss 11.4 % x 6 mo  Po intake < 50% x 1 wk    RD PN 2/8   x Weight Status over a time frame Usual Body Weight (UBW), k.9 kg  % Usual Body Weight: 101.61  % Weight Change From Usual Weight: 1.4 %   RD PN 2/8   x Subcutaneous Fat and/or Muscle Loss Moderate lean muscle mass wasting    Cachectic RD PN 2/8    ED MD/H&P    Fluid Accumulation or Edema      Reduced  Strength     x Wt / BMI / Usual Body Weight Height: 5' 7" (170.2 cm)  Height (inches): 67 in  Weight Method: Standard Scale  Weight: 50.6 kg (111 lb 8.8 oz)  Weight (lb): 111.55 lb  Ideal Body Weight (IBW), Male: 148 lb  % Ideal Body Weight, Male (lb): 75.37 lb  BMI (Calculated): 17.5    BMI Grade: 17 - 18.4 protein-energy malnutrition grade I RD PN 2/8   x Delayed Wound Healing / Failure to Thrive bruises to arms, skin tear to right arm x2.   Foam dressings on skin tears, heels and sacrum ICU RN 2/5   x Acute or Chronic Illness GI Bleed  Reflux esophagitis  Gastritis  Duodenitits  Duodenal ulcer  Duodenal stenoisis  Duodenal mucosal " atrophy  ABLA  Hypovolemia hypotension  Shock  Copd - Likely contributing to respiratory failure  Dementia  encephalopathy EGD Report              Cc md pn 2/4    Ed md  HOsp PN 2/6    Medication     x Treatment Recommendation/Intervention:   1.Cont to adv diet to bland/ low fiber as tolerated per SLP.   2.Encourage PO intake > 50%.     RD PN 2/8    Other       AND / ASPEN Clinical Characteristics (October 2011)  A minimum of two characteristics is recommended for diagnosing either moderate or severe malnutrition   Mild Malnutrition Moderate Malnutrition Severe Malnutrition   Energy Intake from p.o., TF or TPN. < 75% intake of estimated energy needs for less than 7 days < 75% intake of estimated energy needs for greater than 7 days < 50% intake of estimated energy needs for > 5 days   Weight Loss 1-2% in 1 month  5% in 3 months  7.5% in 6 months  10% in 1 year 1-2 % in 1 week  5% in 1 month  7.5% in 3 months  10% in 6 months  20% in 1 year > 2% in 1 week  > 5% in 1 month  > 7.5% in 3 months  > 10% in 6 months  > 20% in 1 year   Physical Findings     None *Mild subcutaneous fat and/or muscle loss  *Mild fluid accumulation  *Stage II decubitus  *Surgical wound or non-healing wound *Mod/severe subcutaneous fat and/or muscle loss  *Mod/severe fluid accumulation  *Stage III or IV decubitus  *Non-healing surgical wound     Provider, please specify diagnosis or diagnoses associated with above clinical findings.    [  ] Mild Protein-Calorie Malnutrition   [x  ] Moderate Protein-Calorie Malnutrition   [  ] Cachexia   [  ] Other Nutritional Diagnosis (please specify):    [  ] Other:    [  ] Clinically Undetermined       Please document in your progress notes daily for the duration of treatment until resolved and include in your discharge summary.

## 2019-02-12 NOTE — PHYSICIAN QUERY
PT Name: Krish Blackburn  MR #: [id number]    Physician Query Form - Respiratory Condition Clarification      Tiki Chaparro RN, CCDS  Desk # [phone]; leno # [phone] [email or web address]      This form is a permanent document in the medical record.    Query Date: February 12, 2019    By submitting this query, we are merely seeking further clarification of documentation. Please utilize your independent clinical judgment when addressing the question(s) below.    The Medical record contains the following   Indicators   Supporting Clinical Findings Location in Medical Record   x   SOB, JEREZ, Wheezing, Productive Cough, Use of Accessory Muscles, etc. No SOB  Productive cough  Non-productive cough    expansion symmetric;  no retractions;  no use of accessory muscles  depth regular;  pattern regular  Anterior:; Lateral:; coarse; diminished Per H&P - DC Summ  RRT Notes 2/4-8          RRT Notes 02/4 - 2/8       x   Acute/Chronic Illness 1. Acute GI Bleed  2. Acute blood loss anemia  3. Hypovolemic hypotension    COPD  wears oxygen but unable to quantify how many litres     Aspiration pneumonia    Encephalopathy H&P              CC Med PN 2/5    HOsp PN 2/6     x   Radiology Findings chest &x-ray shows possible increase in left lower lobe infiltrate.  Sant[place] PN 2/4   x   Respiratory Distress or Failure RR regular and non labored    Acute hypoxic respiratory failure     Effort normal and breath sounds normal. No respiratory distress.     COPD    Likely contributing to respiratory failure    Acute on chronic respiratory failure with hypoxia   ED Note 2/2    S[place] PN 2/4    CC Med PN 2/4        HOsp PN 2/6    Progress Notes by Fifi Adler MD at 2/7   x Hypoxia or Hypercapnia hypoxic respiratory failure, altered mentation   hypoxia HOsp PN 2/6  ED MD   x   RR         ABGs         O2 sat Rr: 16 - 18  HR: 55 - 84  BP: 78/45 - 99/54; 104/57 - 113/56   VS Flow Sheet 2/2-2/8    BiPAP/Intubation     x   Supplemental  O2 NRB Mask 99% sats  Ventimask 50%  with 94% sats  5 lpm NC 90% sats  7-8 lpm Hi flow O2     Venti mask 15L 50% sats maintained >88%.   Pt has wet, productive cough VS Flow Sheet 2/2-2/8            ICU RN 2/5   x   Home O2, Oxygen Dependence Pt is on 4L via N/C as of 2 years ago, believed higher by now per daughter    - He states he wears oxygen but unable to quantify how many liters , somewhere around 4-6L but he has been doing well on 2L this morning and sometimes off oxygen  Plan of Care by Kev Blankenship, RN at 2/3      CC Med PN 2/5   x   Treatment  titrate oxygen as needed.   Initiate antibiotics.   Duo- nebs PRN  Sant[place] PN 2/4.    H&P      Other           Respiratory failure can be acute, chronic or both. It is generally further specified as hypoxic, hypercapnic or both. Lastly, it is important to identify an etiology, if known or suspected.   References:: https://www.acphospitalist.org/archives/2013/10/coding; htm; http://[employer]/acute-respiratory-failure-know  The clinical guidelines noted below are only system guidelines, and do not replace the providers clinical judgment.    Provider, please specify diagnosis or diagnoses associated with above clinical findings.                     Please clarify the appropriate respiratory condition for this encounter.                        Brian all that apply.          [   ] Acute Respiratory Failure with Hypoxia - ABG pO2 < 60 mmHg or O2 sat of 88% on RA and respiratory symptoms documented   [ x  ] Acute and (on) Chronic Respiratory Failure with Hypoxia - pO2 >10 mmHg below baseline OR SpO2 < 91% on usual home O2 OR O2 > 2L/min over baseline home O2    [   ] Other Acute and (on) Chronic Respiratory Failure    [   ] Chronic Respiratory Failure with Hypoxia -Continuous home oxygen use   [   ] Acute Respiratory Insufficiency - Generally describes less severe respiratory symptoms and measurements (pO2, SpO2, pH, and pCO2) NOT meeting criteria for respiratory  failure     [   ] Acute Respiratory Distress - Generally describes less severe respiratory symptoms (tachypnea, in respiratory distress, increased work of breathing, unable to speak in complete sentences, labored breathing, use of accessory muscles, RR> 24, cyanosis, dyspnea, wheezing, stridor, lethargy) without sufficient measurements (pO2, SpO2, pH, and pCO2) to meet criteria for respiratory failure    [   ] Other Respiratory Diagnosis (please specify): _________________________________   [   ]  Clinically Undetermined       Please document in your progress notes daily for the duration of treatment until resolved and include in your discharge summary.

## 2019-02-12 NOTE — PHYSICIAN QUERY
"PT Name: Krish Blackburn  MR #: [id number]     Physician Query Form - Documentation Clarification      Tiki Chaparro RN, CCDS  Desk # [phone]; leno # [phone] [email or web address]      This form is a permanent document in the medical record.     Query Date: February 12, 2019    By submitting this query, we are merely seeking further clarification of documentation. Please utilize your independent clinical judgment when addressing the question(s) below.    The Medical record reflects the following:    Supporting Clinical Findings Location in Medical Record   EGD performed the day of presentation showed Grade D reflux esophagitis, duodenitis, and a large duodenal ulcer which was not amenable to intervention.    Revealed gastritis/duodenitis and [place] Class IIc duodenal ulcer with flat pigmented spot, too large for intervention   DC Summary                                                                                Doctor, Please specify diagnosis or diagnoses associated with above clinical findings.      Please further specify "gastritis" and "duodenitis".       2 part question    Provider Use Only    1 of 2.  Further specify "gastritis".               (  ) Acute Gastritis w/o bleeding              (  ) Acute Gastritis with bleeding                (  ) Chronic Gastritis w/o bleeding               (  ) Chronic Gastritis with bleeding                (  ) Other diagnosis - specify: ___________________              (  ) Undeterminable    2 of 2.  Further specify "duodenitis"         ( x ) with bleeding         (  ) without bleeding         (  ) Undeterminable                                                                                                         [  ] Clinically Undetermined               "

## 2019-02-12 NOTE — PHYSICIAN QUERY
"PT Name: Krish Blackburn  MR #: [id number]     PHYSICIAN QUERY -  ACUITY OF CONDITION CLARIFICATION      Tiki Chaparro RN, CCDS  Desk # [phone]; [facility] # [phone] [email or web address]    This form is a permanent document in the medical record.     Query Date: February 12, 2019    By submitting this query, we are merely seeking further clarification of documentation to reflect the severity of illness of your patient. Please utilize your independent clinical judgment when addressing the question(s) below.    The Medical record reflects the following:     Indicators  Supporting Clinical Findings Location in Medical Record   x Documentation of condition S/p 3 u PRBC in ED  - EGD performed day of presentation. Revealed gastritis/duodenitis and [place] Class IIc duodenal ulcer with flat pigmented spot, too large for intervention   Hosp PN 2/8   x Lab Value(s)     2/2/2019 01:57   Hemoglobin 5.8 (LL)   Hematocrit 19.4 (LL)    ED MD    Radiology Findings     x Treatment                                 Medication - After drop in hemoglobin following EGD, CTA performed, no active bleeding seen therefore embolization not possible  - Hemoglobin has remained stable since CTA  - S/p PPI gtt x 72 hours, now on BID PPI IV Hosp PN 2/8   x Other PRBC x3 Blood Bank     Provider, please specify the acuity/chronicity of "Duodenal Ulcer":    [   ] Acute   [   ] Chronic   [ x  ] Acute and/on chronic   [   ]  Clinically Undetermined       Please document in your progress notes daily for the duration of treatment until resolved, and include in your discharge summary.  "

## 2019-02-12 NOTE — PHYSICIAN QUERY
"PT Name: Krish Blackburn  MR #: [id number]    Physician Query Form - Heart  Condition Clarification     Tiki Chaparro RN, CCDS  Desk # [phone]; leno # [phone] [email or web address]    This form is a permanent document in the medical record.     Query Date: February 12, 2019    By submitting this query, we are merely seeking further clarification of documentation. Please utilize your independent clinical judgment when addressing the question(s) below.    The medical record contains the following   Indicators     Supporting Clinical Findings Location in Medical Record    BNP      EF     x Radiology findings chest &x-ray shows possible increase in left lower lobe infiltrate.  Santanilla PN 2/4    Echo Results      "Ascites" documented     x "SOB" or "JEREZ" documented No SOB  Productive cough  Non-productive cough     expansion symmetric;  no retractions;  no use of accessory muscles  depth regular;  pattern regular  Anterior:; Lateral:; coarse; diminished Per H&P - DC Summ  RRT Notes 2/4-8              RRT Notes 02/4 - 2/8   x "Hypoxia" documented Hypoxa ED MD   x Heart Failure documented Hx of CHF H&P   x "Edema" documented 1+ pitting edema bilat legs  H&P   x Diuretics/Meds Home Meds:   Lasic 20 mg daily PO Home Meds List   x Treatment: Lasix 20 mg IV in ed x1 MAR   x Other:  NRB Mask 99% sats  Ventimask 50%  with 94% sats  5 lpm NC 90% sats  7-8 lpm Hi flow O2      Venti mask 15L 50% sats maintained >88%.   Pt has wet, productive cough    1. Acute GI Bleed  2. Acute blood loss anemia  3. Hypovolemic hypotension   COPD - Likely contributing to respiratory failure  wears oxygen but unable to quantify how many litres   Aspiration pneumonia   Encephalopathy VS Flow Sheet 2/2-2/8                 ICU RN 2/5    H&P      HOsp PN 2/6     CC Med PN 2/5     HOsp PN 2/6     Heart failure (HF) can be acute, chronic or both. It is generally further specificed as systolic, diastolic, or combined. Lastly, it is important to " identify an underlying etiology if known or suspected.   Common clues to acute exacerbation:  Rapidly progressive symptoms (w/in 2 weeks of presentation), using IV diuretics to treat, using supplemental O2, pulmonary edema on Xray, MI w/in 4 weeks, and/or BNP >500    Systolic Heart Failure: is defined as chart documentation of a left ventricular ejection fraction (LVEF) less than 40%   Diastolic Heart Failure: is defined as a left ventricular ejection fraction (LVEF) greater than 40%   +      Evidence of diastolic dysfunction on echocardiography OR    Right heart catheterization wedge pressure above 12 mm Hg OR    Left heart catheterization left ventricular end diastolic pressure 18 mm Hg or above.  References: *American Heart Association    The clinical guidelines noted below are only system guidelines, and do not replace the providers clinical judgment.     Provider, please specify the diagnosis associated with above clinical findings     [   ] Chronic Systolic Heart Failure - Pre-existing systolic HF diagnosis.  EF < 40%  without  acute HF symptoms documented                              [   ] Chronic Diastolic Heart Failure - Pre-existing diastolic HF diagnosis.  EF > 40%  without  acute HF symptoms documented            [   ] Chronic Combined Systolic and Diastolic Heart Failure    [   ] Other Type of Heart Failure (please specify type): _________________________    [   ] Heart Failure Ruled Out    [ x  ] Other (please specify): ________aspiration pneumonia___________________________    [  ] Clinically Undetermined                          Please document in your progress notes daily for the duration of treatment until resolved and include in your discharge summary.

## 2019-02-15 NOTE — PHYSICIAN QUERY
"PT Name: Krish Blackburn  MR #: [id number]     Physician Query Form - Documentation Clarification      Tiki Chaparro RN, CCDS  Desk # [phone]; leno # [phone] [email or web address]      This form is a permanent document in the medical record.     Query Date: February 15, 2019    By submitting this query, we are merely seeking further clarification of documentation. Please utilize your independent clinical judgment when addressing the question(s) below.    The Medical record reflects the following:    Supporting Clinical Findings Location in Medical Record   EGD performed the day of presentation showed Grade D reflux esophagitis, duodenitis, and a large duodenal ulcer which was not amenable to intervention.     Revealed gastritis/duodenitis and [place] Class IIc duodenal ulcer with flat pigmented spot, too large for intervention DC Summary                                                                              Doctor, Please specify diagnosis or diagnoses associated with above clinical findings.       Please further specify "gastritis".     Provider Use Only       ( x ) Acute Gastritis w/o bleeding     (  ) Acute Gastritis with bleeding         (  ) Chronic Gastritis w/o bleeding       (  ) Chronic Gastritis with bleeding          (  ) Other diagnosis - specify: ___bleeding was due to esophagitis, duodenal ulcer, not gastritis________________                                                                                                                         [  ]  Clinically Undetermined               "

## 2020-08-15 NOTE — ASSESSMENT & PLAN NOTE
ASPIRATION PNEUMONIA    - patient with episodes of coughing with food   - consolidation on left base concerning for aspiration pneumonia  - on clindamycin   -SLP evaluation of safety for swallowing, recommended NPO and Modified Swallow study pending  -Modified Swallow Study     I personally performed the service described in the documentation recorded by the scribe in my presence, and it accurately and completely records my words and actions.

## 2023-10-03 NOTE — TREATMENT PLAN
"Treatment Plan  02/05/2019  9:20 AM    Interval History:  Patient with mild hypotension overnight which responded to fluid.  Per nursing slow trickle of blood from rectum (refer to exam).  Hgb stable this morning.    Focused Physical Exam:  BP (!) 103/55 (BP Location: Right arm, Patient Position: Lying)   Pulse 64   Temp 97.6 °F (36.4 °C) (Oral)   Resp 14   Ht 5' 7" (1.702 m)   Wt 49.9 kg (110 lb 0.2 oz)   SpO2 96%   BMI 17.23 kg/m²   Abdomen: soft and not tender  JIMY: Melanic/maroon stool around the exterior of the rectum    Labs:   2/5/2019 04:19   Sodium 141   Potassium 3.2 (L)   Chloride 110   CO2 25   Anion Gap 6 (L)   BUN, Bld 18   Creatinine 0.6   eGFR if non African American >60.0   eGFR if African American >60.0   Glucose 84      2/5/2019 04:19   WBC 5.55   RBC 2.89 (L)   Hemoglobin 8.6 (L)   Hematocrit 27.6 (L)   MCV 96   MCH 29.8   MCHC 31.2 (L)   RDW 18.0 (H)   Platelets 91 (L)     Recommendations:  87 year old male with a duodenal ulcer s/p scope on 2/2 and CTA on 2/3. Overnight mild hypotension which responded to fluid as will as small amount of melanic/marroon stool around the rectum. At this point recommend continuing to monitor patient while on PPI gtt, no repeat intervention at this time.  --Advance diet as tolerated  --Monitor for overt signs of bleeding  --Monitor H/H and transfuse as needed  --Continue PPI gtt   --We will continue to follow alongside primary team    Refugio Jacome M.D.  Gastroenterology Fellow, PGY-V  Pager: [phone]  [facility]    " Medicated for pain.
